# Patient Record
Sex: FEMALE | Race: WHITE | NOT HISPANIC OR LATINO | Employment: FULL TIME | URBAN - METROPOLITAN AREA
[De-identification: names, ages, dates, MRNs, and addresses within clinical notes are randomized per-mention and may not be internally consistent; named-entity substitution may affect disease eponyms.]

---

## 2017-01-13 ENCOUNTER — OFFICE VISIT (OUTPATIENT)
Dept: URGENT CARE | Facility: MEDICAL CENTER | Age: 32
End: 2017-01-13
Payer: COMMERCIAL

## 2017-01-13 PROCEDURE — 99213 OFFICE O/P EST LOW 20 MIN: CPT

## 2017-01-24 ENCOUNTER — ALLSCRIPTS OFFICE VISIT (OUTPATIENT)
Dept: OTHER | Facility: OTHER | Age: 32
End: 2017-01-24

## 2017-01-24 DIAGNOSIS — N91.2 AMENORRHEA: ICD-10-CM

## 2017-01-27 ENCOUNTER — HOSPITAL ENCOUNTER (OUTPATIENT)
Dept: RADIOLOGY | Facility: MEDICAL CENTER | Age: 32
Discharge: HOME | End: 2017-01-27
Payer: COMMERCIAL

## 2017-01-27 DIAGNOSIS — N91.2 AMENORRHEA: ICD-10-CM

## 2017-01-27 LAB
HPV 18 (HISTORICAL): NOT DETECTED
HPV HIGH RISK 16/18 (HISTORICAL): NOT DETECTED
HPV16 (HISTORICAL): DETECTED
PAP (HISTORICAL): ABNORMAL

## 2017-01-27 PROCEDURE — 76830 TRANSVAGINAL US NON-OB: CPT

## 2017-01-27 PROCEDURE — 76856 US EXAM PELVIC COMPLETE: CPT

## 2017-03-08 ENCOUNTER — OFFICE VISIT (OUTPATIENT)
Dept: URGENT CARE | Facility: MEDICAL CENTER | Age: 32
End: 2017-03-08
Payer: COMMERCIAL

## 2017-03-08 PROCEDURE — 99213 OFFICE O/P EST LOW 20 MIN: CPT

## 2017-05-18 ENCOUNTER — OFFICE VISIT (OUTPATIENT)
Dept: URGENT CARE | Facility: MEDICAL CENTER | Age: 32
End: 2017-05-18
Payer: COMMERCIAL

## 2017-05-18 PROCEDURE — 99213 OFFICE O/P EST LOW 20 MIN: CPT

## 2017-10-08 ENCOUNTER — OFFICE VISIT (OUTPATIENT)
Dept: URGENT CARE | Facility: MEDICAL CENTER | Age: 32
End: 2017-10-08
Payer: COMMERCIAL

## 2017-10-08 PROCEDURE — 99213 OFFICE O/P EST LOW 20 MIN: CPT

## 2017-10-14 ENCOUNTER — OFFICE VISIT (OUTPATIENT)
Dept: URGENT CARE | Facility: MEDICAL CENTER | Age: 32
End: 2017-10-14
Payer: COMMERCIAL

## 2017-10-14 PROCEDURE — 99213 OFFICE O/P EST LOW 20 MIN: CPT

## 2017-10-14 NOTE — PROGRESS NOTES
Assessment  1  Metz's neuroma of left foot (355 6) (X67 25)    Plan  Metz's neuroma of left foot    · PredniSONE 20 MG Oral Tablet; Take 1 tablet twice daily    Discussion/Summary  Discussion Summary:   1  Take Prednisone 20mg  1 tablet twice daily x 5 daysICE to area 10-15 min 3-4x dailyOrtho consult if symptoms persist    Understands and agrees with treatment plan: The treatment plan was reviewed with the patient/guardian  The patient/guardian understands and agrees with the treatment plan      Chief Complaint  1  Foot Problem  Chief Complaint Free Text Note Form: While bending down to put on stockings of patient developed pain of L foot - pain 8/10 while walking      History of Present Illness  HPI: The patient is a 16-year-old female presents with pain on her left foot that started one day prior  She states she was bending and had the weight on the balls of her feet 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 when she felt some discomfort between first and second toes of her left foot  She denies any burning or tingling but states the pain is sharp in nature and rates it as an 8 on a scale 1-10  Patient denies pain at rest, her pain increases with weightbearing activity  She denies any prior foot or ankle injuries  Hospital Based Practices Required Assessment:   Pain Assessment   the patient states they have pain  The pain is located in the L foot  (on a scale of 0 to 10, the patient rates the pain at 8 )   Abuse And Domestic Violence Screen    Yes, the patient is safe at home  -- The patient states no one is hurting them  Depression And Suicide Screen  No, the patient has not had thoughts of hurting themself  No, the patient has not felt depressed in the past 7 days  Prefered Language is  english  Primary Language is  english  Readiness To Learn: Receptive  Barriers To Learning: none     Preferred Learning: verbal      Review of Systems  Focused-Female:   Constitutional: No fever, no chills, feels well, no tiredness, no recent weight gain or loss  Musculoskeletal: no arthralgias,-- no joint swelling,-- no myalgias-- and-- no joint stiffness  Neurological: no numbness-- and-- no tingling  Active Problems  1  Acute low back pain (724 2) (M54 5)   2  Amenorrhea (626 0) (N91 2)   3  Asthma (493 90) (J45 909)   4  Encounter for gynecological examination without abnormal finding (V72 31) (Z01 419)   5  History of migraine headaches (V12 49) (Z86 69)   6  Screening for HPV (human papillomavirus) (V73 81) (Z11 51)    Past Medical History  1  History of Acute viral pharyngitis (462) (J02 8,B97 89)   2  History of acute sinusitis (V12 69) (Z87 09)  Active Problems And Past Medical History Reviewed: The active problems and past medical history were reviewed and updated today  Family History  Mother    1  Family history of Anxiety and depression   2  Family history of arthritis (V17 7) (Z82 61)   3  Family history of Sleep disorder  Father    4  Family history of Blood Transfusion (___ Ml)   5  Family history of diabetes mellitus (V18 0) (Z83 3)  Sister    6  Family history of Anxiety and depression  Brother    7  Family history of Anxiety and depression  Paternal Grandmother    6  Family history of breast disorder (V19 8) (Z84 2)   9  Family history of diabetes mellitus (V18 0) (Z83 3)   10  Family history of malignant neoplasm of stomach (V16 0) (Z80 0)   11  Family history of thyroid disease (V18 19) (Z83 49)   12  Family history of Ovarian cancer  Family History    13  Family history of breast disorder (V19 8) (Z84 2)   14  Family history of malignant neoplasm of stomach (V16 0) (Z80 0)   15  Family history of thyroid disease (V18 19) (Z83 49)   16  Family history of Ovarian cancer  Family History Reviewed: The family history was reviewed and updated today  Social History   · Never a smoker   · Sexually active   · Social alcohol use (Z78 9)  Social History Reviewed:  The social history was reviewed and updated today  Surgical History  1  History of Oral Surgery Tooth Extraction  Surgical History Reviewed: The surgical history was reviewed and updated today  Current Meds   1  Albuterol 90 MCG/ACT AERS; Therapy: (Recorded:56Kqm7670) to Recorded   2  Ibuprofen TABS; Therapy: ([id number]) to Recorded  Medication List Reviewed: The medication list was reviewed and updated today  Allergies  1  Lidocaine-Epinephrine SOLN   2  amoxicillin    Vitals  Signs   Recorded: 54JAH9359 10:18AM   Temperature: 98 F  Heart Rate: 78  Respiration: 18  Systolic: 066  Diastolic: 78  Weight: 944 lb   BMI Calculated: 29 04  BSA Calculated: 2  O2 Saturation: 99  Pain Scale: 8    Physical Exam    Constitutional   General appearance: No acute distress, well appearing and well nourished  Pulmonary   Respiratory effort: No increased work of breathing or signs of respiratory distress  Auscultation of lungs: Clear to auscultation  Cardiovascular   Auscultation of heart: Normal rate and rhythm, normal S1 and S2, without murmurs  Musculoskeletal   Inspection/palpation of joints, bones, and muscles: Abnormal  -- There is some tenderness to palpation between the webspace of the first and second toe, no tenderness to palpation over the metatarsals of the metatarsal heads, there is no visible swelling or ecchymosis        Future Appointments    Date/Time Provider Specialty Site   01/29/2018 10:00 AM Renea Mcadams [facility] Obstetrics/Gynecology [facility] OB/GYN [facility]     Signatures   Electronically signed by : Guido Guardado [facility]; Oct  8 2017 10:36AM EST                       (Author)    Electronically signed by : ODESSA Stephens ; Oct 13 2017 11:15AM EST                       (Co-author)

## 2017-10-16 NOTE — PROGRESS NOTES
Assessment  1  Acute URI (465 9) (J06 9)    Discussion/Summary  Discussion Summary:   Take zyrtec as directed fluid intake   Medication Side Effects Reviewed: Possible side effects of new medications were reviewed with the patient/guardian today  Understands and agrees with treatment plan: The treatment plan was reviewed with the patient/guardian  The patient/guardian understands and agrees with the treatment plan   Counseling Documentation With Imm: The patient was counseled regarding diagnostic results,-- instructions for management,-- risk factor reductions,-- prognosis,-- patient and family education,-- impressions,-- risks and benefits of treatment options,-- importance of compliance with treatment  Follow Up Instructions: Follow Up with your Primary Care Provider in 1-2 days  If your symptoms worsen, go to the nearest [place] [id number] Emergency Department  Chief Complaint  Chief Complaint Free Text Note Form: cough, post nasal drip, congestion  started 2 weeks ago  PCP gave her anitbiotics for a URI  History of Present Illness  Hospital Based Practices Required Assessment:   Pain Assessment   the patient states they do not have pain  (on a scale of 0 to 10, the patient rates the pain at 0 )   Abuse And Domestic Violence Screen    Yes, the patient is safe at home  -- The patient states no one is hurting them  Depression And Suicide Screen  No, the patient has not had thoughts of hurting themself  No, the patient has not felt depressed in the past 7 days  Prefered Language is  english  Primary Language is  english  Readiness To Learn: Receptive  Barriers To Learning: none  Preferred Learning: verbal   Upper Respiratory Infection (Brief):  The patient is being seen for an upper respiratory infection   Symptoms: nasal congestion,-- runny nose,-- scratchy throat-- and-- dry cough, but-- no sore throat,-- no productive cough,-- no clear sputum,-- no colored sputum,-- no facial pain,-- no facial pressure,-- no ear pain-- and-- no hoarseness  Associated Symptoms: general malaise, but-- no fever,-- no chills,-- no swollen lymph nodes,-- no headache,-- no myalgias,-- no arthralgias,-- no nausea,-- no vomiting-- and-- no diarrhea  Review of Systems  Focused-Female:   Constitutional: No fever, no chills, feels well, no tiredness, no recent weight gain or loss  ENT: no ear ache, no loss of hearing, no nosebleeds or nasal discharge, no sore throat or hoarseness-- and-- as noted in HPI  Cardiovascular: no complaints of slow or fast heart rate, no chest pain, no palpitations, no leg claudication or lower extremity edema  Respiratory: no complaints of shortness of breath, no wheezing, no dyspnea on exertion, no orthopnea or PND  Breasts: no complaints of breast pain, breast lump or nipple discharge  Gastrointestinal: no complaints of abdominal pain, no constipation, no nausea or diarrhea, no vomiting, no bloody stools  Genitourinary: no complaints of dysuria, no incontinence, no pelvic pain, no dysmenorrhea, no vaginal discharge or abnormal vaginal bleeding  Musculoskeletal: no complaints of arthralgia, no myalgia, no joint swelling or stiffness, no limb pain or swelling  Integumentary: no complaints of skin rash or lesion, no itching or dry skin, no skin wounds  Neurological: no complaints of headache, no confusion, no numbness or tingling, no dizziness or fainting  Active Problems  1  Acute low back pain (724 2) (M54 5)   2  Amenorrhea (626 0) (N91 2)   3  Asthma (493 90) (J45 909)   4  Encounter for gynecological examination without abnormal finding (V72 31) (Z01 419)   5  History of migraine headaches (V12 49) (Z86 69)   6  Metz's neuroma of left foot (355 6) (G57 62)   7  Screening for HPV (human papillomavirus) (V73 81) (Z11 51)    Past Medical History  1  History of Acute viral pharyngitis (462) (J02 8,B97 89)   2   History of acute sinusitis (V12 69) (Z87 09)  Active Problems And Past Medical History Reviewed: The active problems and past medical history were reviewed and updated today  Family History  Mother    1  Family history of Anxiety and depression   2  Family history of arthritis (V17 7) (Z82 61)   3  Family history of Sleep disorder  Father    4  Family history of Blood Transfusion (___ Ml)   5  Family history of diabetes mellitus (V18 0) (Z83 3)  Sister    6  Family history of Anxiety and depression  Brother    7  Family history of Anxiety and depression  Paternal Grandmother    6  Family history of breast disorder (V19 8) (Z84 2)   9  Family history of diabetes mellitus (V18 0) (Z83 3)   10  Family history of malignant neoplasm of stomach (V16 0) (Z80 0)   11  Family history of thyroid disease (V18 19) (Z83 49)   12  Family history of Ovarian cancer  Family History    13  Family history of breast disorder (V19 8) (Z84 2)   14  Family history of malignant neoplasm of stomach (V16 0) (Z80 0)   15  Family history of thyroid disease (V18 19) (Z83 49)   16  Family history of Ovarian cancer  Family History Reviewed: The family history was reviewed and updated today  Social History   · Never a smoker   · Sexually active   · Social alcohol use (Z78 9)  Social History Reviewed: The social history was reviewed and updated today  Surgical History  1  History of Oral Surgery Tooth Extraction  Surgical History Reviewed: The surgical history was reviewed and updated today  Current Meds   1  Albuterol 90 MCG/ACT AERS; Therapy: (Recorded:08Oct2017) to Recorded   2  Ibuprofen TABS; Therapy: ([id number]) to Recorded  Medication List Reviewed: The medication list was reviewed and updated today  Allergies  1   Lidocaine-Epinephrine SOLN   2  amoxicillin    Vitals  Signs   Recorded: 13NSB5754 09:56AM   Temperature: 97 5 F  Heart Rate: 70  Respiration: 16  Systolic: 914  Diastolic: 75  Height: 5 ft 8 in  Weight: 191 lb   BMI Calculated: 29 04  BSA Calculated: 2  O2 Saturation: 100  Pain Scale: 0    Physical Exam    Constitutional   General appearance: No acute distress, well appearing and well nourished  Eyes   Conjunctiva and lids: No swelling, erythema or discharge  Pupils and irises: Equal, round and reactive to light  Ears, Nose, Mouth, and Throat   External inspection of ears and nose: Normal     Otoscopic examination: Tympanic membranes translucent with normal light reflex  Canals patent without erythema  Nasal mucosa, septum, and turbinates: Abnormal   There was clear rhinorrhea from both nares  Oropharynx: Abnormal   The posterior pharynx was erythematous  Inspection of the oropharynx showed fully visible tonsils, uvula and soft palate (Mallampati class 1)  Pulmonary   Respiratory effort: No increased work of breathing or signs of respiratory distress  Auscultation of lungs: Clear to auscultation  Cardiovascular   Palpation of heart: Normal PMI, no thrills  Auscultation of heart: Normal rate and rhythm, normal S1 and S2, without murmurs  Examination of extremities for edema and/or varicosities: Normal     Lymphatic   Palpation of lymph nodes in neck: No lymphadenopathy  Skin   Skin and subcutaneous tissue: Normal without rashes or lesions      Psychiatric   Orientation to person, place, and time: Normal     Mood and affect: Normal        Future Appointments    Date/Time Provider Specialty Site   01/29/2018 10:00 AM [id number] [address] Obstetrics/Gynecology [facility] OB/GYN ASSOC [facility] SURGICAL [facility]     Signatures   Electronically signed by : Marco Antonio Alvarez [facility]; Oct 14 2017 10:19AM EST                       (Author)    Electronically signed by : ODESSA Mcqueen ; Oct 15 2017  9:38AM EST                       (Co-author)

## 2017-12-19 ENCOUNTER — APPOINTMENT (OUTPATIENT)
Dept: PHYSICAL THERAPY | Facility: MEDICAL CENTER | Age: 32
End: 2017-12-19
Payer: COMMERCIAL

## 2017-12-19 PROCEDURE — 97161 PT EVAL LOW COMPLEX 20 MIN: CPT

## 2017-12-19 PROCEDURE — G8991 OTHER PT/OT GOAL STATUS: HCPCS

## 2017-12-19 PROCEDURE — G8990 OTHER PT/OT CURRENT STATUS: HCPCS

## 2017-12-28 ENCOUNTER — APPOINTMENT (OUTPATIENT)
Dept: PHYSICAL THERAPY | Facility: MEDICAL CENTER | Age: 32
End: 2017-12-28
Payer: COMMERCIAL

## 2017-12-28 PROCEDURE — 97110 THERAPEUTIC EXERCISES: CPT

## 2018-01-02 ENCOUNTER — APPOINTMENT (OUTPATIENT)
Dept: PHYSICAL THERAPY | Facility: MEDICAL CENTER | Age: 33
End: 2018-01-02
Payer: COMMERCIAL

## 2018-01-02 PROCEDURE — 97110 THERAPEUTIC EXERCISES: CPT

## 2018-01-04 ENCOUNTER — APPOINTMENT (OUTPATIENT)
Dept: PHYSICAL THERAPY | Facility: MEDICAL CENTER | Age: 33
End: 2018-01-04
Payer: COMMERCIAL

## 2018-01-09 ENCOUNTER — APPOINTMENT (OUTPATIENT)
Dept: PHYSICAL THERAPY | Facility: MEDICAL CENTER | Age: 33
End: 2018-01-09
Payer: COMMERCIAL

## 2018-01-09 PROCEDURE — 97010 HOT OR COLD PACKS THERAPY: CPT

## 2018-01-09 PROCEDURE — 97110 THERAPEUTIC EXERCISES: CPT

## 2018-01-11 ENCOUNTER — APPOINTMENT (OUTPATIENT)
Dept: PHYSICAL THERAPY | Facility: MEDICAL CENTER | Age: 33
End: 2018-01-11
Payer: COMMERCIAL

## 2018-01-11 PROCEDURE — 97010 HOT OR COLD PACKS THERAPY: CPT

## 2018-01-11 PROCEDURE — 97110 THERAPEUTIC EXERCISES: CPT

## 2018-01-15 VITALS
BODY MASS INDEX: 29.55 KG/M2 | SYSTOLIC BLOOD PRESSURE: 106 MMHG | HEIGHT: 68 IN | DIASTOLIC BLOOD PRESSURE: 62 MMHG | WEIGHT: 195 LBS

## 2018-01-16 ENCOUNTER — APPOINTMENT (OUTPATIENT)
Dept: PHYSICAL THERAPY | Facility: MEDICAL CENTER | Age: 33
End: 2018-01-16
Payer: COMMERCIAL

## 2018-01-17 ENCOUNTER — APPOINTMENT (OUTPATIENT)
Dept: PHYSICAL THERAPY | Facility: MEDICAL CENTER | Age: 33
End: 2018-01-17
Payer: COMMERCIAL

## 2018-01-17 PROCEDURE — 97110 THERAPEUTIC EXERCISES: CPT

## 2018-01-18 ENCOUNTER — APPOINTMENT (OUTPATIENT)
Dept: PHYSICAL THERAPY | Facility: MEDICAL CENTER | Age: 33
End: 2018-01-18
Payer: COMMERCIAL

## 2018-01-18 PROCEDURE — 97110 THERAPEUTIC EXERCISES: CPT

## 2018-01-18 NOTE — PROGRESS NOTES
Assessment    1  Acute viral pharyngitis (462) (J02 8,B60 89)    Discussion/Summary  Discussion Summary:   Take motrin as directed for pain  Increase fluid intake   Warm water salt gargles, throat lozenges, and honey tea for sore throat  Medication Side Effects Reviewed: Possible side effects of new medications were reviewed with the patient/guardian today  Understands and agrees with treatment plan: The treatment plan was reviewed with the patient/guardian  The patient/guardian understands and agrees with the treatment plan   Counseling Documentation With Imm: The patient was counseled regarding diagnostic results, instructions for management, risk factor reductions, prognosis, patient and family education, impressions, risks and benefits of treatment options, importance of compliance with treatment  Follow Up Instructions: Follow Up with your Primary Care Provider in 1-2 days  If your symptoms worsen, go to the nearest [facility] Emergency Department  Chief Complaint  Chief Complaint Free Text Note Form: started 3 days ago with runny nose PND, congestion, slight cough, feels like something is "stuck in her throat on the side of the tongue started l;ast night      History of Present Illness  Hospital Based Practices Required Assessment:   Pain Assessment   the patient states they have pain  The pain is located in the throat  (on a scale of 0 to 10, the patient rates the pain at 3 )   Abuse And Domestic Violence Screen    Yes, the patient is safe at home  The patient states no one is hurting them  Depression And Suicide Screen  No, the patient has not had thoughts of hurting themself  No, the patient has not felt depressed in the past 7 days  Prefered Language is  english  Primary Language is  english  Readiness To Learn: Receptive  Barriers To Learning: none  Preferred Learning: verbal   Pharyngitis (Brief):  The patient is being seen for an initial evaluation of and symptoms x 2 days pharyngitis  Symptoms:  sore throat, nasal congestion, postnasal drainage and swollen glands, but no fever, no chills and no rash  No associated symptoms are reported  Review of Systems  Focused-Female:   Constitutional: No fever, no chills, feels well, no tiredness, no recent weight gain or loss  ENT: no ear ache, no loss of hearing, no nosebleeds or nasal discharge, no sore throat or hoarseness and as noted in HPI  Cardiovascular: no complaints of slow or fast heart rate, no chest pain, no palpitations, no leg claudication or lower extremity edema  Respiratory: no complaints of shortness of breath, no wheezing, no dyspnea on exertion, no orthopnea or PND  Breasts: no complaints of breast pain, breast lump or nipple discharge  Gastrointestinal: no complaints of abdominal pain, no constipation, no nausea or diarrhea, no vomiting, no bloody stools  Genitourinary: no complaints of dysuria, no incontinence, no pelvic pain, no dysmenorrhea, no vaginal discharge or abnormal vaginal bleeding  Musculoskeletal: no complaints of arthralgia, no myalgia, no joint swelling or stiffness, no limb pain or swelling  Integumentary: no complaints of skin rash or lesion, no itching or dry skin, no skin wounds  Neurological: no complaints of headache, no confusion, no numbness or tingling, no dizziness or fainting  ROS Reviewed:   ROS reviewed  Active Problems    1  Acute sinusitis (461 9) (J01 90)   2  Amenorrhea (626 0) (N91 2)   3  Asthma (493 90) (J45 909)   4  Encounter for gynecological examination without abnormal finding (V72 31) (Z01 419)   5  Screening for HPV (human papillomavirus) (V73 81) (Z11 51)    Past Medical History    1  History of migraine headaches (V12 49) (Z86 69)  Active Problems And Past Medical History Reviewed: The active problems and past medical history were reviewed and updated today  Family History  Mother    1  Family history of Anxiety and depression   2   Family history of arthritis (V17 7) (Z82 61)   3  Family history of Sleep disorder  Father    4  Family history of Blood Transfusion (___ Ml)   5  Family history of diabetes mellitus (V18 0) (Z83 3)  Sister    6  Family history of Anxiety and depression  Brother    7  Family history of Anxiety and depression  Paternal Grandmother    6  Family history of breast disorder (V19 8) (Z84 2)   9  Family history of diabetes mellitus (V18 0) (Z83 3)   10  Family history of malignant neoplasm of stomach (V16 0) (Z80 0)   11  Family history of thyroid disease (V18 19) (Z83 49)   12  Family history of Ovarian cancer  Family History    13  Family history of breast disorder (V19 8) (Z84 2)   14  Family history of malignant neoplasm of stomach (V16 0) (Z80 0)   15  Family history of thyroid disease (V18 19) (Z83 49)   16  Family history of Ovarian cancer  Family History Reviewed: The family history was reviewed and updated today  Social History    · Never a smoker   · Sexually active   · Social alcohol use (Z78 9)  Social History Reviewed: The social history was reviewed and updated today  Surgical History    1  History of Oral Surgery Tooth Extraction  Surgical History Reviewed: The surgical history was reviewed and updated today  Current Meds   1  Ibuprofen TABS; Therapy: ([id number]) to Recorded  Medication List Reviewed: The medication list was reviewed and updated today  Allergies    1  Lidocaine-Epinephrine SOLN   2  amoxicillin    Vitals  Signs   Recorded: 53VNB9826 08:02PM   Temperature: 98 6 F  Heart Rate: 78  Respiration: 20  Systolic: 598  Diastolic: 80  Weight: 604 lb   BMI Calculated: 29 5  BSA Calculated: 2 02  Pain Scale: 3    Physical Exam    Constitutional   General appearance: No acute distress, well appearing and well nourished  Eyes   Conjunctiva and lids: No swelling, erythema or discharge  Pupils and irises: Equal, round and reactive to light      Ears, Nose, Mouth, and Throat   External inspection of ears and nose: Normal     Otoscopic examination: Tympanic membranes translucent with normal light reflex  Canals patent without erythema  Nasal mucosa, septum, and turbinates: Abnormal   normal nasal mucosa  There was a purulent discharge from both nares  Oropharynx: Abnormal   The posterior pharynx was erythematous, had a(n) right ulcer and + PND  Inspection of the oropharynx showed visible hard and soft palate, upper portion of tonsils and uvula (Mallampati class 2)  Pulmonary   Respiratory effort: No increased work of breathing or signs of respiratory distress  Auscultation of lungs: Clear to auscultation  Cardiovascular   Palpation of heart: Normal PMI, no thrills  Auscultation of heart: Normal rate and rhythm, normal S1 and S2, without murmurs  Examination of extremities for edema and/or varicosities: Normal     Abdomen   Abdomen: Non-tender, no masses  Liver and spleen: No hepatomegaly or splenomegaly  Lymphatic   Palpation of lymph nodes in neck: No lymphadenopathy  Musculoskeletal   Gait and station: Normal     Digits and nails: Normal without clubbing or cyanosis  Inspection/palpation of joints, bones, and muscles: Normal     Skin   Skin and subcutaneous tissue: Normal without rashes or lesions  Neurologic   Cranial nerves: Cranial nerves 2-12 intact  Reflexes: 2+ and symmetric  Sensation: No sensory loss      Psychiatric   Orientation to person, place, and time: Normal     Mood and affect: Normal        Future Appointments    Date/Time Provider Specialty Site   01/29/2018 10:00 AM Phillip Cartwright [facility] Obstetrics/Gynecology [facility] OB/GYN ASSOC [facility] SURGICAL [facility]     Signatures   Electronically signed by : Frida Cooper [facility]; Mar  8 2017  8:24PM EST                       (Author)    Electronically signed by : ODESSA Chavez ; Mar  9 2017  4:15PM EST                       (Co-author)

## 2018-01-23 ENCOUNTER — APPOINTMENT (OUTPATIENT)
Dept: PHYSICAL THERAPY | Facility: MEDICAL CENTER | Age: 33
End: 2018-01-23
Payer: COMMERCIAL

## 2018-01-25 ENCOUNTER — APPOINTMENT (OUTPATIENT)
Dept: PHYSICAL THERAPY | Facility: MEDICAL CENTER | Age: 33
End: 2018-01-25
Payer: COMMERCIAL

## 2018-01-30 ENCOUNTER — APPOINTMENT (OUTPATIENT)
Dept: PHYSICAL THERAPY | Facility: MEDICAL CENTER | Age: 33
End: 2018-01-30
Payer: COMMERCIAL

## 2018-02-13 ENCOUNTER — OFFICE VISIT (OUTPATIENT)
Dept: OBGYN CLINIC | Facility: MEDICAL CENTER | Age: 33
End: 2018-02-13
Payer: COMMERCIAL

## 2018-02-13 VITALS
HEIGHT: 67 IN | WEIGHT: 192 LBS | SYSTOLIC BLOOD PRESSURE: 116 MMHG | BODY MASS INDEX: 30.13 KG/M2 | DIASTOLIC BLOOD PRESSURE: 72 MMHG

## 2018-02-13 DIAGNOSIS — R87.610 ASCUS WITH POSITIVE HIGH RISK HPV CERVICAL: ICD-10-CM

## 2018-02-13 DIAGNOSIS — B97.7 HPV (HUMAN PAPILLOMA VIRUS) INFECTION: Primary | ICD-10-CM

## 2018-02-13 DIAGNOSIS — N91.2 AMENORRHEA: ICD-10-CM

## 2018-02-13 DIAGNOSIS — Z01.419 ENCOUNTER FOR GYNECOLOGICAL EXAMINATION (GENERAL) (ROUTINE) WITHOUT ABNORMAL FINDINGS: ICD-10-CM

## 2018-02-13 DIAGNOSIS — R87.810 ASCUS WITH POSITIVE HIGH RISK HPV CERVICAL: ICD-10-CM

## 2018-02-13 PROBLEM — G57.62 MORTON'S NEUROMA OF LEFT FOOT: Status: ACTIVE | Noted: 2017-10-08

## 2018-02-13 PROCEDURE — 88344 IMHCHEM/IMCYTCHM EA MLT ANTB: CPT | Performed by: PATHOLOGY

## 2018-02-13 PROCEDURE — 99395 PREV VISIT EST AGE 18-39: CPT | Performed by: PHYSICIAN ASSISTANT

## 2018-02-13 PROCEDURE — 57454 BX/CURETT OF CERVIX W/SCOPE: CPT | Performed by: PHYSICIAN ASSISTANT

## 2018-02-13 PROCEDURE — 88305 TISSUE EXAM BY PATHOLOGIST: CPT | Performed by: PHYSICIAN ASSISTANT

## 2018-02-13 PROCEDURE — 88344 IMHCHEM/IMCYTCHM EA MLT ANTB: CPT | Performed by: PHYSICIAN ASSISTANT

## 2018-02-13 PROCEDURE — 88305 TISSUE EXAM BY PATHOLOGIST: CPT | Performed by: PATHOLOGY

## 2018-02-13 RX ORDER — MEDROXYPROGESTERONE ACETATE 10 MG/1
10 TABLET ORAL DAILY
Qty: 7 TABLET | Refills: 0 | Status: SHIPPED | OUTPATIENT
Start: 2018-02-13 | End: 2018-03-23 | Stop reason: ALTCHOICE

## 2018-02-13 RX ORDER — ALBUTEROL SULFATE 90 UG/1
AEROSOL, METERED RESPIRATORY (INHALATION)
COMMUNITY
End: 2019-03-25

## 2018-02-13 RX ORDER — TRAMADOL HYDROCHLORIDE 50 MG/1
TABLET ORAL
COMMUNITY
Start: 2018-01-22 | End: 2020-06-29 | Stop reason: ALTCHOICE

## 2018-02-13 RX ORDER — MEDROXYPROGESTERONE ACETATE 10 MG/1
10 TABLET ORAL DAILY
Qty: 7 TABLET | Refills: 0 | Status: SHIPPED | OUTPATIENT
Start: 2018-02-13 | End: 2018-02-13 | Stop reason: SDUPTHER

## 2018-02-13 NOTE — PROGRESS NOTES
Colposcopy  Date/Time: 2/13/2018 3:45 PM  Performed by: Theodora Andrade  Authorized by: Theodora Andrade     Consent:     Consent obtained:  Written    Consent given by:  Patient    Procedural risks discussed:  Bleeding, damage to other organs and infection    Patient questions answered: yes      Patient agrees, verbalizes understanding, and wants to proceed: yes    Pre-procedure:     Prepped with: acetic acid    Indication:     Indication:  ASC-US  Procedure:     Procedure: Colposcopy w/ cervical biopsy and ECC      [place] speculum was placed in the vagina: yes      Under colposcopic examination the transition zone was seen in entirety: yes      Endocervix was curetted using a Kevorkian curette: yes      Cervical biopsy performed with a cervical biopsy punch: yes      Monsel's solution was applied: yes      Biopsy(s): yes      Location:  12, 6, and 9 o'clock    Specimen to pathology: yes    Post-procedure:     Findings: White epithelium      Impression: Low grade cervical dysplasia      Patient tolerance of procedure: Tolerated well, no immediate complications      Problem List Items Addressed This Visit     Encounter for gynecological examination (general) (routine) without abnormal findings     Annual exam performed, pap deferred, performed colpo instead since patient never had done last year  Provera rx sent via EMR, patient wants to observe menses after provera to see if returns to normal   Discussed further work up, pt wants to wait  RTO 1 year  ASCUS with positive high risk HPV cervical     Colposcopy performed today without complication, 3 biopsies and ECC performed and sent to pathology, will call patient with results  Can expect cramping, irregular bleeding and clumpy discharge    Call if any heavy bleeding, severe abdominal pain, or fever > 101           Relevant Orders    Colposcopy      Other Visit Diagnoses     HPV (human papilloma virus) infection    -  Primary    Amenorrhea Relevant Medications    medroxyPROGESTERone (PROVERA) 10 mg tablet

## 2018-02-13 NOTE — PROGRESS NOTES
Assessment/Plan  Problem List Items Addressed This Visit     Encounter for gynecological examination (general) (routine) without abnormal findings     Annual exam performed, pap deferred, performed colpo instead since patient never had done last year  Provera rx sent via EMR, patient wants to observe menses after provera to see if returns to normal   Discussed further work up, pt wants to wait  RTO 1 year  ASCUS with positive high risk HPV cervical     Colposcopy performed today without complication, 3 biopsies and ECC performed and sent to pathology, will call patient with results  Can expect cramping, irregular bleeding and clumpy discharge  Call if any heavy bleeding, severe abdominal pain, or fever > 101           Relevant Orders    Colposcopy      Other Visit Diagnoses     HPV (human papilloma virus) infection    -  Primary    Amenorrhea        Relevant Medications    medroxyPROGESTERone (PROVERA) 10 mg tablet          Halina Birmingham is a 33 y o  female who presents for annual GYN exam  She reports any changes in Medical history- dx with degenerative disc disease  She denies any changes in surgical and family histories  Periods are irregular, menses every 2-6 months, lmp 9/2017  Dysmenorrhea:mild, occurring first 1-2 days of flow  She denies intermenstrual bleeding, spotting, or discharge  She reports that she is sexually active with 1 partner and using none for contraception  Would welcome pregnancy  Last Pap smear: 1/2017, ASCUS HPV 16 (+) never returned for colpo due to work schedule  Regular self breast exam: yes  Family history of uterine or ovarian cancer: no  Family history of breast cancer: yes - maternal aunt  Family history of colon cancer: yes - MGM    Menstrual History:  OB History     No data available         Patient's last menstrual period was 09/25/2017    Period Pattern: Regular  Menstrual Flow: Moderate  Dysmenorrhea: (!) Mild  Dysmenorrhea Symptoms: Cramping    The following portions of the patient's history were reviewed and updated as appropriate: allergies, current medications, past family history, past medical history, past social history, past surgical history and problem list     Review of Systems  Review of Systems   Constitutional: Negative for chills and fever  Respiratory: Negative for shortness of breath  Cardiovascular: Negative for chest pain  Gastrointestinal: Negative for abdominal pain  Genitourinary: Negative for dysuria, pelvic pain, vaginal discharge and vaginal pain  Negative for breast pain or lumps  Negative for stress urinary incontinence  Neurological: Negative for headaches  Objective   /72 (BP Location: Right arm, Patient Position: Sitting, Cuff Size: Standard)   Ht 5' 7" (1 702 m)   Wt 87 1 kg (192 lb)   LMP 09/25/2017   BMI 30 07 kg/m²     Physical Exam   Constitutional: She is oriented to person, place, and time  She appears well-developed and well-nourished  Neck: No thyromegaly present  Cardiovascular: Normal rate and regular rhythm  Pulmonary/Chest: Effort normal and breath sounds normal  Right breast exhibits no mass, no nipple discharge, no skin change and no tenderness  Left breast exhibits no mass, no nipple discharge, no skin change and no tenderness  Abdominal: Soft  There is no tenderness  There is no rebound and no guarding  Genitourinary: There is no rash or lesion on the right labia  There is no rash or lesion on the left labia  Uterus is not enlarged and not tender  Cervix exhibits no motion tenderness and no discharge  Right adnexum displays no mass and no tenderness  Left adnexum displays no mass and no tenderness  No bleeding in the vagina  No vaginal discharge found  Neurological: She is alert and oriented to person, place, and time  Psychiatric: She has a normal mood and affect  Nursing note and vitals reviewed

## 2018-02-13 NOTE — PATIENT INSTRUCTIONS
Colposcopy   WHAT YOU NEED TO KNOW:   A colposcopy is a procedure to look for abnormal cells in your cervix and vagina  Your healthcare provider will use a colposcope, which is a small scope with a light on it  DISCHARGE INSTRUCTIONS:   Medicines:   · Pain medicine: You may be given medicine to take away or decrease pain  Do not wait until the pain is severe before you take your medicine  · Take your medicine as directed  Call your healthcare provider if you think your medicine is not helping or if you have side effects  Tell him if you are allergic to any medicine  Keep a list of the medicines, vitamins, and herbs you take  Include the amounts, and when and why you take them  Bring the list or the pill bottles to follow-up visits  Carry your medicine list with you in case of an emergency  Follow up with your healthcare provider or gynecologist as directed: You may need to return for more tests or to have abnormal cells removed  Write down your questions so you remember to ask them during your visits  Self-care:  Use a sanitary pad for any light bleeding  Ask when it is okay to use tampons, douche, or have sex  If you are pregnant, do not put anything in your vagina until your healthcare provider says it is okay  Avoid heavy lifting for 24 hours after your procedure, this will decrease your risk of bleeding  Contact your healthcare provider or gynecologist if:   · You have pain that does not go away, even after you take pain medicine  · You have a fever  · You have questions or concerns about your condition or care  Seek care immediately or call 911 if:   · You have bleeding from your vagina that is heavier than your monthly period  © 2016 [employer] is for End User's use only and may not be sold, redistributed or otherwise used for commercial purposes   All illustrations and images included in CareNotes® are the copyrighted property of A D A M [employer] Inc  or [employer]  The above information is an  only  It is not intended as medical advice for individual conditions or treatments  Talk to your doctor, nurse or pharmacist before following any medical regimen to see if it is safe and effective for you

## 2018-02-13 NOTE — ASSESSMENT & PLAN NOTE
Colposcopy performed today without complication, 3 biopsies and ECC performed and sent to pathology, will call patient with results  Can expect cramping, irregular bleeding and clumpy discharge    Call if any heavy bleeding, severe abdominal pain, or fever > 101

## 2018-02-13 NOTE — ASSESSMENT & PLAN NOTE
Annual exam performed, pap deferred, performed colpo instead since patient never had done last year  Provera rx sent via EMR, patient wants to observe menses after provera to see if returns to normal   Discussed further work up, pt wants to wait  RTO 1 year

## 2018-03-23 ENCOUNTER — OFFICE VISIT (OUTPATIENT)
Dept: OBGYN CLINIC | Facility: MEDICAL CENTER | Age: 33
End: 2018-03-23
Payer: COMMERCIAL

## 2018-03-23 VITALS — DIASTOLIC BLOOD PRESSURE: 70 MMHG | SYSTOLIC BLOOD PRESSURE: 120 MMHG | WEIGHT: 192 LBS | BODY MASS INDEX: 30.07 KG/M2

## 2018-03-23 DIAGNOSIS — D06.9 SEVERE DYSPLASIA OF CERVIX (CIN III): Primary | ICD-10-CM

## 2018-03-23 PROBLEM — M51.9 LUMBAR DISC DISORDER: Status: ACTIVE | Noted: 2018-03-23

## 2018-03-23 PROBLEM — G43.009 MIGRAINE WITHOUT AURA AND WITHOUT STATUS MIGRAINOSUS, NOT INTRACTABLE: Status: ACTIVE | Noted: 2018-03-23

## 2018-03-23 PROBLEM — J45.20 MILD INTERMITTENT ASTHMA WITHOUT COMPLICATION: Status: ACTIVE | Noted: 2018-03-23

## 2018-03-23 PROCEDURE — 99213 OFFICE O/P EST LOW 20 MIN: CPT | Performed by: OBSTETRICS & GYNECOLOGY

## 2018-03-23 NOTE — PROGRESS NOTES
Assessment/Plan:   Problem List Items Addressed This Visit        Genitourinary    Severe dysplasia of cervix (MARIPOSA III) - Primary     MARIPOSA II-III found on cervical biopsy at 6 o'clock  Recommended treatment for this is to proceed with a LEEP procedure  We discussed what a LEEP procedure entails, how it is performed  We discussed that rationale for performing the procedure to remove the pre-cancerous cells  We also discussed risks of surgery including bleeding, infection, damage to surrounding tissues, and hypothetical risk of a short cervix in pregnancy with PTL/PTB  Consent was obtained today  We will plan on proceeding in the office  I spent 20 minutes with the patient, >50% of the time counseling  Subjective:     Patient ID: Tricia Chinchilla is a 33 y o  female  Patient is a 26yo G0 here to discuss her colposcopy results and the recommended LEEP procedure  She had an abnormal pap - and underwent colposcopy which revealed MARIPOSA II-III at 6 o'clock  She reports she has previously had abnormal paps and had a colposcopy - but did not need any further treatment  She works as a CNA, 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 is considering a job change in the near future  She does plan on having children, is uncertain as to the timing  Reviewed her problem list and PMH/PSH with her today  Review of Systems   Constitutional: Negative for chills and fever  Respiratory: Negative for shortness of breath  Cardiovascular: Negative for chest pain  Gastrointestinal: Negative for abdominal pain  Genitourinary: Negative for vaginal bleeding and vaginal discharge  Musculoskeletal: Positive for back pain  Psychiatric/Behavioral: Negative  No past medical history on file    Past Surgical History:   Procedure Laterality Date    WISDOM TOOTH EXTRACTION  07/2004     Patient Active Problem List   Diagnosis    Metz's neuroma of left foot    Encounter for gynecological examination (general) (routine) without abnormal findings    Severe dysplasia of cervix (MARIPOSA III)    Lumbar disc disorder    Migraine without aura and without status migrainosus, not intractable    Mild intermittent asthma without complication         Objective:    /70   Wt 87 1 kg (192 lb)   LMP 03/01/2018   BMI 30 07 kg/m²      Physical Exam   Constitutional: She is oriented to person, place, and time  She appears well-developed and well-nourished  No distress  Cardiovascular: Normal rate, regular rhythm and normal heart sounds  No murmur heard  Pulmonary/Chest: Effort normal and breath sounds normal  No respiratory distress  She has no wheezes  She exhibits no tenderness  Neurological: She is alert and oriented to person, place, and time  Skin: Skin is warm and dry  She is not diaphoretic  Psychiatric: She has a normal mood and affect   Her behavior is normal

## 2018-03-23 NOTE — ASSESSMENT & PLAN NOTE
MARIPOSA II-III found on cervical biopsy at 6 o'clock  Recommended treatment for this is to proceed with a LEEP procedure  We discussed what a LEEP procedure entails, how it is performed  We discussed that rationale for performing the procedure to remove the pre-cancerous cells  We also discussed risks of surgery including bleeding, infection, damage to surrounding tissues, and hypothetical risk of a short cervix in pregnancy with PTL/PTB  Consent was obtained today  We will plan on proceeding in the office

## 2018-04-12 ENCOUNTER — DOCUMENTATION (OUTPATIENT)
Dept: OBGYN CLINIC | Facility: CLINIC | Age: 33
End: 2018-04-12

## 2018-04-12 ENCOUNTER — TELEPHONE (OUTPATIENT)
Dept: OBGYN CLINIC | Facility: CLINIC | Age: 33
End: 2018-04-12

## 2018-04-12 NOTE — TELEPHONE ENCOUNTER
Certified letter mailed
I have left Ron Gardner 5 messages, messages were left on 3/28/18, 3/29/18, 4/4/18, 4/5/18, and today 4/12/18  Please send pt a certified letter to call if she would like us to do her LEEP procedure   Thank you
spouse

## 2018-04-25 ENCOUNTER — DOCUMENTATION (OUTPATIENT)
Dept: OBGYN CLINIC | Facility: CLINIC | Age: 33
End: 2018-04-25

## 2019-03-25 ENCOUNTER — ANNUAL EXAM (OUTPATIENT)
Dept: OBGYN CLINIC | Facility: MEDICAL CENTER | Age: 34
End: 2019-03-25
Payer: COMMERCIAL

## 2019-03-25 VITALS
SYSTOLIC BLOOD PRESSURE: 120 MMHG | WEIGHT: 193 LBS | HEIGHT: 67 IN | DIASTOLIC BLOOD PRESSURE: 66 MMHG | BODY MASS INDEX: 30.29 KG/M2

## 2019-03-25 DIAGNOSIS — D06.9 SEVERE DYSPLASIA OF CERVIX (CIN III): ICD-10-CM

## 2019-03-25 DIAGNOSIS — Z11.51 SCREENING FOR HUMAN PAPILLOMAVIRUS (HPV): ICD-10-CM

## 2019-03-25 DIAGNOSIS — Z01.419 ENCNTR FOR GYN EXAM (GENERAL) (ROUTINE) W/O ABN FINDINGS: Primary | ICD-10-CM

## 2019-03-25 PROCEDURE — 87624 HPV HI-RISK TYP POOLED RSLT: CPT | Performed by: PHYSICIAN ASSISTANT

## 2019-03-25 PROCEDURE — 99395 PREV VISIT EST AGE 18-39: CPT | Performed by: PHYSICIAN ASSISTANT

## 2019-03-25 PROCEDURE — G0145 SCR C/V CYTO,THINLAYER,RESCR: HCPCS | Performed by: PHYSICIAN ASSISTANT

## 2019-03-25 NOTE — ASSESSMENT & PLAN NOTE
Annual exam and pap performed, will call if abnormal  Not interested in contraception, would be ok if got pregnant, rec starting PNV if capable of pregnancy    RTO 1 year

## 2019-03-25 NOTE — PROGRESS NOTES
Assessment/Plan  Problem List Items Addressed This Visit        Genitourinary    Severe dysplasia of cervix (MARIPOSA III)    Relevant Orders    Liquid-based pap, screening       Other    Encntr for gyn exam (general) (routine) w/o abn findings - Primary     Annual exam and pap performed, will call if abnormal  Not interested in contraception, would be ok if got pregnant, rec starting PNV if capable of pregnancy  RTO 1 year         Relevant Orders    Liquid-based pap, screening      Other Visit Diagnoses     Screening for human papillomavirus (HPV)        Relevant Orders    Liquid-based pap, screening          Subjective   Kasandra Johnson is a 29 y o  female who presents for annual GYN exam  She denies any changes in Surgical or Family histories  Medical hx changes- dx with anemia  Periods are regular every 28-30 days  Dysmenorrhea:moderate, occurring first 1-2 days of flow  She denies intermenstrual bleeding, spotting, or discharge  She reports that she is sexually active with 1 partner and using none for contraception  Last Pap smear: 2017 ASCUS HPV+, 2018 CIN2-3 never scheduled LEEP due to insurance issues  Regular self breast exam: yes    Family history of uterine or ovarian cancer: MGM ovarian  Family history of breast cancer: no  Family history of colon cancer: no    Menstrual History:  OB History        0    Para   0    Term   0       0    AB   0    Living   0       SAB   0    TAB   0    Ectopic   0    Multiple   0    Live Births   0                Patient's last menstrual period was 2019 (exact date)    Period Pattern: (!) Irregular  Menstrual Flow: Light, Heavy  Dysmenorrhea: (!) Mild  Dysmenorrhea Symptoms: Cramping    The following portions of the patient's history were reviewed and updated as appropriate: allergies, current medications, past family history, past medical history, past social history, past surgical history and problem list     Review of Systems  Review of Systems Constitutional: Negative for chills and fever  Respiratory: Negative for shortness of breath  Cardiovascular: Negative for chest pain  Gastrointestinal: Negative for abdominal pain  Genitourinary: Negative for dysuria, pelvic pain, vaginal bleeding, vaginal discharge and vaginal pain  Negative for breast pain or lumps  Negative for stress urinary incontinence  Neurological: Negative for headaches  Objective   /66 (BP Location: Left arm, Patient Position: Sitting, Cuff Size: Large)   Ht 5' 7" (1 702 m)   Wt 87 5 kg (193 lb)   LMP 03/21/2019 (Exact Date)   BMI 30 23 kg/m²     Physical Exam   Constitutional: She is oriented to person, place, and time  She appears well-developed and well-nourished  Neck: No thyromegaly present  Cardiovascular: Normal rate and regular rhythm  Pulmonary/Chest: Effort normal and breath sounds normal  Right breast exhibits no mass, no nipple discharge, no skin change and no tenderness  Left breast exhibits no mass, no nipple discharge, no skin change and no tenderness  Abdominal: Soft  There is no tenderness  There is no rebound and no guarding  Genitourinary: There is no rash or lesion on the right labia  There is no rash or lesion on the left labia  Uterus is not enlarged and not tender  Cervix exhibits no motion tenderness and no discharge  Right adnexum displays no mass and no tenderness  Left adnexum displays no mass and no tenderness  No bleeding in the vagina  No vaginal discharge found  Neurological: She is alert and oriented to person, place, and time  Psychiatric: She has a normal mood and affect  Nursing note and vitals reviewed

## 2019-03-27 LAB
HPV HR 12 DNA CVX QL NAA+PROBE: NEGATIVE
HPV16 DNA CVX QL NAA+PROBE: NEGATIVE
HPV18 DNA CVX QL NAA+PROBE: NEGATIVE

## 2019-03-29 LAB
LAB AP GYN PRIMARY INTERPRETATION: NORMAL
LAB AP LMP: NORMAL
Lab: NORMAL

## 2019-05-03 DIAGNOSIS — IMO0001 CONTRACEPTION: Primary | ICD-10-CM

## 2019-05-03 RX ORDER — DROSPIRENONE AND ETHINYL ESTRADIOL 0.02-3(28)
1 KIT ORAL DAILY
Qty: 28 TABLET | Refills: 2 | Status: SHIPPED | OUTPATIENT
Start: 2019-05-03 | End: 2019-06-18 | Stop reason: SDUPTHER

## 2019-06-18 DIAGNOSIS — IMO0001 CONTRACEPTION: ICD-10-CM

## 2019-06-18 RX ORDER — DROSPIRENONE AND ETHINYL ESTRADIOL 0.02-3(28)
1 KIT ORAL DAILY
Qty: 28 TABLET | Refills: 10 | Status: SHIPPED | OUTPATIENT
Start: 2019-06-18 | End: 2019-11-01 | Stop reason: SINTOL

## 2019-06-25 ENCOUNTER — TELEPHONE (OUTPATIENT)
Dept: OBGYN CLINIC | Facility: MEDICAL CENTER | Age: 34
End: 2019-06-25

## 2019-10-24 ENCOUNTER — APPOINTMENT (OUTPATIENT)
Dept: RADIOLOGY | Facility: MEDICAL CENTER | Age: 34
End: 2019-10-24
Payer: COMMERCIAL

## 2019-10-24 ENCOUNTER — OFFICE VISIT (OUTPATIENT)
Dept: URGENT CARE | Facility: MEDICAL CENTER | Age: 34
End: 2019-10-24
Payer: COMMERCIAL

## 2019-10-24 VITALS
SYSTOLIC BLOOD PRESSURE: 108 MMHG | HEIGHT: 68 IN | OXYGEN SATURATION: 100 % | TEMPERATURE: 98.2 F | BODY MASS INDEX: 28.64 KG/M2 | WEIGHT: 189 LBS | HEART RATE: 67 BPM | DIASTOLIC BLOOD PRESSURE: 70 MMHG | RESPIRATION RATE: 18 BRPM

## 2019-10-24 DIAGNOSIS — S69.91XA INJURY OF RIGHT WRIST, INITIAL ENCOUNTER: Primary | ICD-10-CM

## 2019-10-24 PROCEDURE — G0382 LEV 3 HOSP TYPE B ED VISIT: HCPCS | Performed by: PHYSICIAN ASSISTANT

## 2019-10-24 PROCEDURE — 73110 X-RAY EXAM OF WRIST: CPT

## 2019-10-24 RX ORDER — IBUPROFEN 800 MG/1
TABLET ORAL
COMMUNITY

## 2019-10-24 RX ORDER — LEVOCETIRIZINE DIHYDROCHLORIDE 5 MG/1
5 TABLET, FILM COATED ORAL EVERY EVENING
Refills: 0 | COMMUNITY
Start: 2019-09-20

## 2019-10-24 NOTE — PROGRESS NOTES
[facility]        NAME: Power Beth is a 29 y o  female  : 1985    MRN: [id number]  DATE: 2019  TIME: 5:40 PM    Assessment and Plan   Injury of right wrist, initial encounter [S69 [id number]]  1  Injury of right wrist, initial encounter  XR wrist 3+ vw right     Preliminary x-ray shows no acute fracture  Will call if positive result  Recommended utilizing Ace wrap while at work  Ice and Motrin for pain  Recommended follow-up with orthopedics if symptoms do not improve after a week  Patient Instructions     May utilize Ace wrap on wrist  Utilize Motrin for pain  Ice the wrist   followup with Orthopedics if symptoms do not improve after a week    Chief Complaint     Chief Complaint   Patient presents with    Wrist Injury     Pt states she was walking up steps and hit her right wrist on a step 10-19-19  History of Present Illness         Patient is a 20-year-old female who presents today with complaints of right wrist injury  She reports approximately 5 days ago she fell onto a step  She fell with her fist clenched and her wrist hit the pavement  No other injuries  She reports bruising on the wrist   She reports most of her pain is on the lateral aspect of the right wrist       Review of Systems   Review of Systems   Constitutional: Negative for fever  Respiratory: Negative for shortness of breath  Cardiovascular: Negative for chest pain  Musculoskeletal: Positive for arthralgias  Negative for joint swelling  Skin: Positive for color change           Current Medications       Current Outpatient Medications:     drospirenone-ethinyl estradiol (PRATEEK) 3-0 02 MG per tablet, Take 1 tablet by mouth daily for 46 days, Disp: 28 tablet, Rfl: 10    ibuprofen (MOTRIN) 800 mg tablet, Take by mouth, Disp: , Rfl:     levocetirizine (XYZAL) 5 MG tablet, Take 5 mg by mouth every evening, Disp: , Rfl: 0    traMADol (ULTRAM) 50 mg tablet, , Disp: , Rfl:     Current Allergies Allergies as of 10/24/2019 - Reviewed 10/24/2019   Allergen Reaction Noted    Amoxicillin  09/13/2016    Lidocaine Rash 09/13/2016            The following portions of the patient's history were reviewed and updated as appropriate: allergies, current medications, past family history, past medical history, past social history, past surgical history and problem list      Past Medical History:   Diagnosis Date    Abnormal Pap smear of cervix     Anemia     Mild normocytic anemia    HPV (human papilloma virus) infection        Past Surgical History:   Procedure Laterality Date    COLPOSCOPY  02/13/2018    MARIPOSA 2-3    WISDOM TOOTH EXTRACTION  07/2004       Family History   Problem Relation Age of Onset    Anxiety disorder Mother     Depression Mother     Arthritis Mother     Sleep disorder Mother     Bipolar disorder Mother     Diabetes Father     Other Father         bladder cancer    Anxiety disorder Sister     Depression Sister     Anxiety disorder Brother     Depression Brother     Other Paternal Grandmother         breast disorder    Diabetes Paternal Grandmother     Stomach cancer Paternal Grandmother     Thyroid disease Paternal Grandmother     Ovarian cancer Paternal Grandmother     Other Family         breast disorder    Stomach cancer Family     Thyroid disease Family     Ovarian cancer Family          Medications have been verified  Objective   /70   Pulse 67   Temp 98 2 °F (36 8 °C) (Temporal)   Resp 18   Ht 5' 8" (1 727 m)   Wt 85 7 kg (189 lb)   LMP 10/10/2019 (Approximate)   SpO2 100%   BMI 28 74 kg/m²        Physical Exam     Physical Exam   Constitutional: She appears well-developed and well-nourished  Cardiovascular: Normal rate and regular rhythm  Pulmonary/Chest: Effort normal and breath sounds normal    Musculoskeletal: She exhibits tenderness  She exhibits no edema or deformity          Right wrist: She exhibits decreased range of motion and tenderness  She exhibits no swelling, no effusion, no deformity and no laceration  +Ecchymosis on ulnar aspect of dorsal left wrist with TTP over this area   Skin: Skin is warm and dry  Capillary refill takes less than 2 seconds

## 2019-10-24 NOTE — PATIENT INSTRUCTIONS
May utilize Ace wrap on wrist   utilize Motrin for pain  Ice the wrist   followup with Orthopedics if symptoms do not improve after a week    Wrist Injury   WHAT YOU NEED TO KNOW:   A wrist injury happens when the tissues of your wrist joint are damaged  Your wrist joint is made up of tendons, ligaments, nerves, and bones  Two common types of injuries that can happen to your wrist are sprains and strains  A sprain can happen when the ligaments are stretched or torn  Ligaments are bands of elastic tissue that connect and hold the bones together  A strain happens when a tendon or muscle is overused, stretched, or torn  Tendons attach your hand and arm muscles to the bones of the wrist    DISCHARGE INSTRUCTIONS:   Medicines:   · NSAIDs:  These medicines decrease swelling, pain, and fever  NSAIDs are available without a doctor's order  Ask which medicine is right for you  Ask how much to take and when to take it  Take as directed  NSAIDs can cause stomach bleeding and kidney problems if not taken correctly  · Pain medicine: You may be given a prescription medicine to decrease pain  Do not wait until the pain is severe before you take this medicine  · Take your medicine as directed  Contact your healthcare provider if you think your medicine is not helping or if you have side effects  Tell him of her if you are allergic to any medicine  Keep a list of the medicines, vitamins, and herbs you take  Include the amounts, and when and why you take them  Bring the list or the pill bottles to follow-up visits  Carry your medicine list with you in case of an emergency  Follow up with your healthcare provider as directed:  Write down your questions so you remember to ask them during your visits  Manage your symptoms:   · Wrist supports:  A cast or splint may be put on your fingers, hand, and wrist to support your wrist and prevent further damage  Wear these as directed   Ask for instructions on how to bathe while you are wearing a splint or case  · Rest:  You may need to rest your wrist for at least 48 hours and avoid activities that cause pain  Ask what activities you should avoid and for how long  · Ice:  Ice helps decrease swelling and pain  Ice may also help prevent tissue damage  Use an ice pack or put crushed ice in a plastic bag  Cover it with a towel and place it on your injured wrist for 15 to 20 minutes every hour as directed  · Compression:  Your healthcare provider may suggest you wrap your wrist with an elastic bandage  This will help decrease swelling, support your wrist, and help it heal  Wear your wrist wrap as directed  Ask for instructions on how to wrap your wrist     · Elevation:  When you sit or lie down, keep your wrist at or above the level of your heart  This may help decrease pain and swelling  Physical therapy:  Your healthcare provider may recommend that you go to physical therapy  A physical therapist shows you how to do exercises that can help to strengthen your wrist and improve its range of movement  These exercises may also help decrease your pain  Prevent another wrist injury:   · Do strengthening exercises: Your healthcare provider or physical therapist may suggest that you do exercises to strengthen your hand and arm muscles  Ask when you may return to your regular physical activities or sports  If you start to exercise too soon it may cause you to injure your wrist again  · Protect your wrists:  Wrist guard splints or protective tape can help to support your wrist during exercise and sports  These devices may also keep your wrist from bending too far back  Ask for more information about the type of wrist support that you should use  Contact your healthcare provider if:   · You have a fever  · The bruising, swelling, or pain in your wrist gets worse  · You have questions or concerns about your condition or care    Return to the emergency department if:   · The skin on or near your wrist or hand feels cold, or it turns blue or white  · The skin on or near your wrist or hand is very tight, raised, and swollen  · You have new trouble moving and using your hands, fingers, or wrist     · Your wrist, hands, or fingers become swollen, red, numb, or they tingle  · Your wrist has any open wounds, including from surgery, that are red, swollen, warm, or have pus coming from them  © 2017 [address] [employer] Information is for End User's use only and may not be sold, redistributed or otherwise used for commercial purposes  All illustrations and images included in CareNotes® are the copyrighted property of [employer] M [employer]  or [employer]  The above information is an  only  It is not intended as medical advice for individual conditions or treatments  Talk to your doctor, nurse or pharmacist before following any medical regimen to see if it is safe and effective for you

## 2019-10-31 ENCOUNTER — TELEPHONE (OUTPATIENT)
Dept: OBGYN CLINIC | Facility: CLINIC | Age: 34
End: 2019-10-31

## 2019-10-31 NOTE — TELEPHONE ENCOUNTER
Incoming call from patient  States that she has been experiencing a lot of acne on her chest since starting Alysha  Has been on Lo Estrin in the past and did not have these symptoms  Would like to switch back  Starts new pack on Tuesday   Will route to provider, BARRY

## 2019-11-01 DIAGNOSIS — Z30.41 SURVEILLANCE OF CONTRACEPTIVE PILL: Primary | ICD-10-CM

## 2019-11-01 RX ORDER — NORETHINDRONE ACETATE AND ETHINYL ESTRADIOL 1; .02 MG/1; MG/1
1 TABLET ORAL DAILY
Qty: 90 TABLET | Refills: 4 | Status: SHIPPED | OUTPATIENT
Start: 2019-11-01 | End: 2020-06-29

## 2019-11-01 NOTE — TELEPHONE ENCOUNTER
contacted pt # [phone]-rec vvm- per hi[ppa communication consent on file - lmom advising as directed and to call bk with decision at my ext or main line provided

## 2019-11-01 NOTE — TELEPHONE ENCOUNTER
I will eRx Loestrin and cancel the Kaylynn rx if that is what she prefers   Please advise completing current pack prior to starting Loestrin

## 2019-11-01 NOTE — TELEPHONE ENCOUNTER
Pt returned my call  Pt agreed to the change and will do as advised  Pt stated script need to be sent to [facility] pharmacy on file for a 90 day supply due to her insurance only covers 90 days supplies

## 2019-12-16 DIAGNOSIS — Z30.41 SURVEILLANCE OF CONTRACEPTIVE PILL: ICD-10-CM

## 2019-12-16 RX ORDER — NORETHINDRONE ACETATE AND ETHINYL ESTRADIOL 1; .02 MG/1; MG/1
1 TABLET ORAL DAILY
Qty: 90 TABLET | Refills: 0 | Status: CANCELLED | OUTPATIENT
Start: 2019-12-16

## 2020-06-02 ENCOUNTER — APPOINTMENT (OUTPATIENT)
Dept: URGENT CARE | Age: 35
End: 2020-06-02
Payer: OTHER MISCELLANEOUS

## 2020-06-02 ENCOUNTER — APPOINTMENT (OUTPATIENT)
Dept: RADIOLOGY | Age: 35
End: 2020-06-02
Payer: OTHER MISCELLANEOUS

## 2020-06-02 DIAGNOSIS — S69.92XA INJURY OF FINGER OF LEFT HAND, INITIAL ENCOUNTER: Primary | ICD-10-CM

## 2020-06-02 DIAGNOSIS — S69.92XA INJURY OF FINGER OF LEFT HAND, INITIAL ENCOUNTER: ICD-10-CM

## 2020-06-02 PROCEDURE — 99284 EMERGENCY DEPT VISIT MOD MDM: CPT | Performed by: NURSE PRACTITIONER

## 2020-06-02 PROCEDURE — 73140 X-RAY EXAM OF FINGER(S): CPT

## 2020-06-02 PROCEDURE — G0383 LEV 4 HOSP TYPE B ED VISIT: HCPCS | Performed by: NURSE PRACTITIONER

## 2020-06-03 ENCOUNTER — APPOINTMENT (OUTPATIENT)
Dept: URGENT CARE | Age: 35
End: 2020-06-03
Payer: OTHER MISCELLANEOUS

## 2020-06-03 PROCEDURE — 99213 OFFICE O/P EST LOW 20 MIN: CPT | Performed by: PHYSICIAN ASSISTANT

## 2020-06-05 ENCOUNTER — APPOINTMENT (OUTPATIENT)
Dept: URGENT CARE | Age: 35
End: 2020-06-05
Payer: OTHER MISCELLANEOUS

## 2020-06-05 PROCEDURE — 99213 OFFICE O/P EST LOW 20 MIN: CPT | Performed by: FAMILY MEDICINE

## 2020-06-29 ENCOUNTER — ANNUAL EXAM (OUTPATIENT)
Dept: OBGYN CLINIC | Facility: MEDICAL CENTER | Age: 35
End: 2020-06-29
Payer: COMMERCIAL

## 2020-06-29 VITALS
SYSTOLIC BLOOD PRESSURE: 120 MMHG | HEIGHT: 68 IN | DIASTOLIC BLOOD PRESSURE: 78 MMHG | WEIGHT: 201 LBS | BODY MASS INDEX: 30.46 KG/M2

## 2020-06-29 DIAGNOSIS — Z01.419 ENCNTR FOR GYN EXAM (GENERAL) (ROUTINE) W/O ABN FINDINGS: ICD-10-CM

## 2020-06-29 PROCEDURE — 99395 PREV VISIT EST AGE 18-39: CPT | Performed by: PHYSICIAN ASSISTANT

## 2020-06-29 RX ORDER — DROSPIRENONE AND ETHINYL ESTRADIOL 0.03MG-3MG
1 KIT ORAL DAILY
Qty: 90 TABLET | Refills: 3 | Status: SHIPPED | OUTPATIENT
Start: 2020-06-29 | End: 2021-03-31 | Stop reason: SDUPTHER

## 2020-07-01 LAB
CYTOLOGIST CVX/VAG CYTO: NORMAL
DX ICD CODE: NORMAL
HPV I/H RISK 1 DNA CVX QL PROBE+SIG AMP: NEGATIVE
OTHER STN SPEC: NORMAL
PATH REPORT.FINAL DX SPEC: NORMAL
SL AMB NOTE:: NORMAL
SL AMB SPECIMEN ADEQUACY: NORMAL
SL AMB TEST METHODOLOGY: NORMAL

## 2021-03-10 DIAGNOSIS — Z23 ENCOUNTER FOR IMMUNIZATION: ICD-10-CM

## 2021-03-24 ENCOUNTER — IMMUNIZATIONS (OUTPATIENT)
Dept: FAMILY MEDICINE CLINIC | Facility: HOSPITAL | Age: 36
End: 2021-03-24

## 2021-03-24 DIAGNOSIS — Z23 ENCOUNTER FOR IMMUNIZATION: Primary | ICD-10-CM

## 2021-03-24 PROCEDURE — 91300 SARS-COV-2 / COVID-19 MRNA VACCINE (PFIZER-BIONTECH) 30 MCG: CPT

## 2021-03-24 PROCEDURE — 0001A SARS-COV-2 / COVID-19 MRNA VACCINE (PFIZER-BIONTECH) 30 MCG: CPT

## 2021-03-31 DIAGNOSIS — Z01.419 ENCNTR FOR GYN EXAM (GENERAL) (ROUTINE) W/O ABN FINDINGS: ICD-10-CM

## 2021-04-05 RX ORDER — DROSPIRENONE AND ETHINYL ESTRADIOL 0.03MG-3MG
1 KIT ORAL DAILY
Qty: 90 TABLET | Refills: 0 | Status: SHIPPED | OUTPATIENT
Start: 2021-04-05 | End: 2021-05-19

## 2021-04-17 ENCOUNTER — IMMUNIZATIONS (OUTPATIENT)
Dept: FAMILY MEDICINE CLINIC | Facility: HOSPITAL | Age: 36
End: 2021-04-17

## 2021-04-17 DIAGNOSIS — Z23 ENCOUNTER FOR IMMUNIZATION: Primary | ICD-10-CM

## 2021-04-17 PROCEDURE — 0002A SARS-COV-2 / COVID-19 MRNA VACCINE (PFIZER-BIONTECH) 30 MCG: CPT

## 2021-04-17 PROCEDURE — 91300 SARS-COV-2 / COVID-19 MRNA VACCINE (PFIZER-BIONTECH) 30 MCG: CPT

## 2021-05-19 DIAGNOSIS — Z01.419 ENCNTR FOR GYN EXAM (GENERAL) (ROUTINE) W/O ABN FINDINGS: ICD-10-CM

## 2021-05-19 RX ORDER — DROSPIRENONE AND ETHINYL ESTRADIOL 0.03MG-3MG
KIT ORAL
Qty: 84 TABLET | Refills: 0 | Status: SHIPPED | OUTPATIENT
Start: 2021-05-19 | End: 2021-09-01 | Stop reason: SDUPTHER

## 2021-08-16 ENCOUNTER — TELEPHONE (OUTPATIENT)
Dept: OBGYN CLINIC | Facility: CLINIC | Age: 36
End: 2021-08-16

## 2021-08-16 NOTE — TELEPHONE ENCOUNTER
----- Message from Myron Florez sent at 8/16/2021  5:50 AM EDT -----  Regarding: Non-Urgent Medical Question  Contact: [phone]  Good morning Kimberly Melgoza,     So my withdraw bleeding started a day later then usual, so as of this morning when I am writing I still have it  Should I still come to my appointment this afternoon at 4:40 or should I reschedule? Please let me know ASAP       Thank you   [facility]

## 2021-08-16 NOTE — TELEPHONE ENCOUNTER
She does need a Pap today, so probably better to reschedule so that we are sure we get a good specimen

## 2021-09-01 ENCOUNTER — ANNUAL EXAM (OUTPATIENT)
Dept: OBGYN CLINIC | Facility: CLINIC | Age: 36
End: 2021-09-01
Payer: COMMERCIAL

## 2021-09-01 VITALS
HEIGHT: 68 IN | BODY MASS INDEX: 31.37 KG/M2 | DIASTOLIC BLOOD PRESSURE: 74 MMHG | SYSTOLIC BLOOD PRESSURE: 120 MMHG | WEIGHT: 207 LBS

## 2021-09-01 DIAGNOSIS — Z01.419 ENCNTR FOR GYN EXAM (GENERAL) (ROUTINE) W/O ABN FINDINGS: ICD-10-CM

## 2021-09-01 PROCEDURE — G0476 HPV COMBO ASSAY CA SCREEN: HCPCS | Performed by: PHYSICIAN ASSISTANT

## 2021-09-01 PROCEDURE — G0145 SCR C/V CYTO,THINLAYER,RESCR: HCPCS | Performed by: PHYSICIAN ASSISTANT

## 2021-09-01 PROCEDURE — 99395 PREV VISIT EST AGE 18-39: CPT | Performed by: PHYSICIAN ASSISTANT

## 2021-09-01 RX ORDER — ERGOCALCIFEROL 1.25 MG/1
CAPSULE ORAL
COMMUNITY
Start: 2021-08-16

## 2021-09-01 RX ORDER — SULFASALAZINE 500 MG/1
1000 TABLET, DELAYED RELEASE ORAL 2 TIMES DAILY
COMMUNITY
Start: 2021-07-21

## 2021-09-01 RX ORDER — DROSPIRENONE AND ETHINYL ESTRADIOL 0.03MG-3MG
1 KIT ORAL DAILY
Qty: 84 TABLET | Refills: 4 | Status: SHIPPED | OUTPATIENT
Start: 2021-09-01

## 2021-09-01 RX ORDER — CYCLOBENZAPRINE HCL 10 MG
10 TABLET ORAL DAILY PRN
COMMUNITY
Start: 2021-08-17

## 2021-09-01 NOTE — PROGRESS NOTES
[address] [facility]  1985      CC:  Yearly exam    S:  30 y o  female here for yearly exam  Her cycles are regular, not heavy or crampy  Sexual activity: She is sexually active without pain, bleeding or dryness  Contraception: She uses Osman for contraception  Pap 6/29/20 neg/neg  Pap 3/25/19 neg/neg  Colpo 2/13/18 MARIPOSA 2-3, did not return for LEEP   Pap 1/2017 ASCUS +HPV 16    We reviewed ASCCP guidelines for Pap testing today  Family hx of breast cancer: no  Family hx of ovarian cancer: PGM  Family hx of colon cancer: MGM      Current Outpatient Medications:     cyclobenzaprine (FLEXERIL) 10 mg tablet, Take 10 mg by mouth daily as needed, Disp: , Rfl:     drospirenone-ethinyl estradiol (OSMAN) 3-0 03 MG per tablet, TAKE 1 TABLET BY MOUTH EVERY DAY, Disp: 84 tablet, Rfl: 0    ergocalciferol (VITAMIN D2) 50,000 units, TAKE 1 CAPSULE BY MOUTH ONE TIME PER WEEK, Disp: , Rfl:     ibuprofen (MOTRIN) 800 mg tablet, Take by mouth, Disp: , Rfl:     levocetirizine (XYZAL) 5 MG tablet, Take 5 mg by mouth every evening, Disp: , Rfl: 0    sulfaSALAzine (AZULFIDINE-EN) 500 mg EC tablet, Take 1,000 mg by mouth 2 (two) times a day, Disp: , Rfl:   Social History     Socioeconomic History    Marital status: /Civil Union     Spouse name: Not on file    Number of children: Not on file    Years of education: Not on file    Highest education level: Not on file   Occupational History    Not on file   Tobacco Use    Smoking status: Never Smoker    Smokeless tobacco: Never Used   Vaping Use    Vaping Use: Some days   Substance and Sexual Activity    Alcohol use:  Yes    Drug use: Not Currently    Sexual activity: Yes     Partners: Male     Birth control/protection: OCP   Other Topics Concern    Not on file   Social History Narrative    Not on file     Social Determinants of Health     Financial Resource Strain:     Difficulty of Paying Living Expenses:    Food Insecurity:     Worried About Running Out of Food in the Last Year:     Patrick of Food in the Last Year:    Transportation Needs:     Lack of Transportation (Medical):  Lack of Transportation (Non-Medical):    Physical Activity:     Days of Exercise per Week:     Minutes of Exercise per Session:    Stress:     Feeling of Stress :    Social Connections:     Frequency of Communication with Friends and Family:     Frequency of Social Gatherings with Friends and Family:     Attends Judaism Services:     Active Member of Clubs or Organizations:     Attends Club or Organization Meetings:     Marital Status:    Intimate Partner Violence:     Fear of Current or Ex-Partner:     Emotionally Abused:     Physically Abused:     Sexually Abused:      Family History   Problem Relation Age of Onset    Anxiety disorder Mother     Depression Mother     Arthritis Mother     Sleep disorder Mother     Bipolar disorder Mother     Asthma Mother     Diabetes Father     Other Father         bladder cancer    Cancer Father         Bladder cancer     Anxiety disorder Sister     Depression Sister     Asthma Sister     Anxiety disorder Brother     Depression Brother     Other Paternal Grandmother         breast disorder    Diabetes Paternal Grandmother     Stomach cancer Paternal Grandmother     Thyroid disease Paternal Grandmother     Ovarian cancer Paternal Grandmother     Other Family         breast disorder    Stomach cancer Family     Thyroid disease Family     Ovarian cancer Family     Colon cancer Maternal Grandmother     Stomach cancer Paternal Grandfather     Diabetes Paternal Grandfather       Past Medical History:   Diagnosis Date    Abnormal Pap smear of cervix     Anemia     Mild normocytic anemia    HPV (human papilloma virus) infection     Migraine         Review of Systems   Respiratory: Negative  Cardiovascular: Negative  Gastrointestinal: Negative for constipation and diarrhea  Genitourinary: Negative for difficulty urinating, pelvic pain, vaginal bleeding, vaginal discharge, itching or odor  O:  Blood pressure 120/74, height 5' 8 11" (1 73 m), weight 93 9 kg (207 lb), last menstrual period 08/14/2021  Patient appears well and is not in distress  Neck is supple without masses  Breasts are symmetrical without mass, tenderness, nipple discharge, skin changes or adenopathy  Abdomen is soft and nontender without masses  External genitals are normal without lesions or rashes  Urethral meatus and urethra are normal  Bladder is normal to palpation  Vagina is normal without discharge or bleeding  Cervix is normal without discharge or lesion  Mildly friable  Uterus is normal, mobile, nontender without palpable mass  Adnexa are normal, nontender, without palpable mass  A:  Yearly exam      P:   Pap and HPV today - if neg, repeat 3 years   Fariba sent to pharmacy     RTO one year for yearly exam or sooner as needed

## 2021-09-07 LAB
LAB AP GYN PRIMARY INTERPRETATION: NORMAL
Lab: NORMAL

## 2022-10-14 ENCOUNTER — ANNUAL EXAM (OUTPATIENT)
Dept: OBGYN CLINIC | Facility: MEDICAL CENTER | Age: 37
End: 2022-10-14
Payer: COMMERCIAL

## 2022-10-14 VITALS
DIASTOLIC BLOOD PRESSURE: 78 MMHG | SYSTOLIC BLOOD PRESSURE: 110 MMHG | HEIGHT: 68 IN | WEIGHT: 212.6 LBS | BODY MASS INDEX: 32.22 KG/M2

## 2022-10-14 DIAGNOSIS — Z01.419 ENCNTR FOR GYN EXAM (GENERAL) (ROUTINE) W/O ABN FINDINGS: ICD-10-CM

## 2022-10-14 DIAGNOSIS — Z01.419 ENCOUNTER FOR ANNUAL ROUTINE GYNECOLOGICAL EXAMINATION: Primary | ICD-10-CM

## 2022-10-14 DIAGNOSIS — Z71.85 HPV VACCINE COUNSELING: ICD-10-CM

## 2022-10-14 PROCEDURE — 99395 PREV VISIT EST AGE 18-39: CPT | Performed by: CLINICAL NURSE SPECIALIST

## 2022-10-14 RX ORDER — COVID-19 ANTIGEN TEST
KIT MISCELLANEOUS
COMMUNITY

## 2022-10-14 RX ORDER — DROSPIRENONE AND ETHINYL ESTRADIOL 0.03MG-3MG
1 KIT ORAL DAILY
Qty: 84 TABLET | Refills: 4 | Status: CANCELLED | OUTPATIENT
Start: 2022-10-14

## 2022-10-14 NOTE — ASSESSMENT & PLAN NOTE
Elton Saucedo couns >27  We discussed the new changes regarding HPV vaccine (AKA Gardasil) today and reviewed the following points  Until recently was only given up to age 32, now approved up to age 55  Unfortunately not all insurances have agreed to pay if given after age 32  Series of 3 injections over the course of 6 months (now, 1 and 6 months)   Primary purpose is the prevention of 9 high risk strains of HPV which are associated with cervical cancer as well as genital warts  Will protect against future exposure but cannot reverse exposure that has already occurred    Encouraged pt to check insurance for HPV/gardasil coverage and start series if agreeable

## 2022-10-14 NOTE — PROGRESS NOTES
Subjective:        Mayela Vizcaino is a 29 y o  female  Here for Gynecologic Exam      GYN HPI  Here for annual gyn exam  Regarding menstrual cycle: Patient denies menstrual complaints  Regular monthly menses, not excessive  She denies any abnormal vaginal complaints; and denies any abnormal urinary complaints    Denies changes or concerns r/t breasts  She sometimes performs self breast exams  She reports she generally eats a healthy diet and does not exercise regularly, active lifestyle  She does get dietary calcium/vit D  She denies any untreated or poorly controlled anxiety or depression sxs  She is sexually active  Contraception: OCP (estrogen/progesterone) and but planning to stopp   planning to use condoms  , She denies issues with intimacy  She reports that she does feel safe at home  The following portions of the patient's history were reviewed and updated as appropriate: allergies, current medications, past family history, past medical history, past social history, past surgical history, and problem list       Hereditary Cancer Screening  Family history reviewed and patient does not meet criteria for referral for genetic cancer assessment  Substance Abuse Screening Completed  See hx and flowsheet  Screens Positive for none       HEALTH MAINTENANCE SCREENINGS:    History of abnormal pap: Yes, 2018, Last Papanicolaou test:  09/01/2021    IMMUNIZATIONS  Gardasil HPV vaccine was not completed    Review of Systems   Constitutional: Negative for appetite change, chills, fatigue, fever and unexpected weight change  HENT: Negative  Eyes: Negative  Respiratory: Negative for chest tightness and shortness of breath  Cardiovascular: Negative for chest pain and palpitations  Gastrointestinal: Negative for abdominal pain, constipation and vomiting  Endocrine: Negative for cold intolerance and heat intolerance     Genitourinary:        As per HPI   Musculoskeletal: Negative for back pain, joint swelling and neck pain  Skin: Negative for color change and rash  Neurological: Negative for dizziness, weakness and numbness  Hematological: Does not bruise/bleed easily  Psychiatric/Behavioral: Negative  Objective:  /78 (BP Location: Left arm, Patient Position: Sitting, Cuff Size: Standard)   Ht 5' 7 5" (1 715 m)   Wt 96 4 kg (212 lb 9 6 oz)   LMP 09/21/2022 (Exact Date)   BMI 32 81 kg/m²        Physical Exam  Constitutional:       General: She is not in acute distress  Appearance: Normal appearance  Genitourinary:      Vulva and rectum normal       No lesions in the vagina  Right Labia: No rash or lesions  Left Labia: No lesions or rash  No vaginal discharge, erythema, tenderness or bleeding  Right Adnexa: not tender and no mass present  Left Adnexa: not tender and no mass present  No cervical motion tenderness, discharge or friability  Uterus is not enlarged or tender  No urethral prolapse present  Pelvic exam was performed with patient in the lithotomy position  Breasts: Breasts are symmetrical       Right: No inverted nipple, mass, nipple discharge, skin change or tenderness  Left: No inverted nipple, mass, nipple discharge, skin change or tenderness  HENT:      Head: Normocephalic and atraumatic  Cardiovascular:      Rate and Rhythm: Normal rate  Heart sounds: No murmur heard  Pulmonary:      Effort: Pulmonary effort is normal       Breath sounds: Normal breath sounds  Abdominal:      General: There is no distension  Palpations: Abdomen is soft  Tenderness: There is no abdominal tenderness  Musculoskeletal:         General: Normal range of motion  Cervical back: Normal range of motion  Lymphadenopathy:      Cervical: No cervical adenopathy  Neurological:      Mental Status: She is alert and oriented to person, place, and time  Skin:     General: Skin is warm and dry  Psychiatric:         Mood and Affect: Mood normal          Behavior: Behavior normal    Vitals reviewed  Assessment/Plan:           [address]  Annual GYN examination completed today  Risk prevention and anticipatory guidance provided including:  • Pap/breast screenings- see below  Risk for hereditary cancers: Family history reviewed and patient does qualify for referral for genetics consult/testing  Referral to genetics oncology offered as indicated  • Calcium and vitamin D supplementation  • Risk factors for lipid, diabetes and thryroid screening, ordered if needed  • Dietary and lifestyle recommendations based on her age and weight  body mass index is 32 81 kg/m²       • PHQ-2 depression screen completed  Reviewed and interventions recommended if needed  • Tobacco and alcohol use, intervention ordered if applicable  Cervical cancer screening  Previous pap smears and ASCCP screening guidelines have been reviewed  Pap smear is indicated at this time  Contraception   Currently on junel  No signficant adverse effects  Not refilled as she is considering stopping this  STI Screening  STI screening is declined  STI prevention discussed with use of condoms  Breast exam and breast cancer screening  Breast exam was done; , breast cancer imaging/screening is not indicated at this time  Problem List Items Addressed This Visit     HPV vaccine counseling     Eduarda Vegas couns >27  We discussed the new changes regarding HPV vaccine (AKA Gardasil) today and reviewed the following points  Until recently was only given up to age 32, now approved up to age 55  Unfortunately not all insurances have agreed to pay if given after age 32  Series of 3 injections over the course of 6 months (now, 1 and 6 months)   Primary purpose is the prevention of 9 high risk strains of HPV which are associated with cervical cancer as well as genital warts    Will protect against future exposure but cannot reverse exposure that has already occurred    Encouraged pt to check insurance for HPV/gardasil coverage and start series if agreeable  Other Visit Diagnoses     Encounter for annual routine gynecological examination    -  Primary    Encntr for gyn exam (general) (routine) w/o abn findings              No orders of the defined types were placed in this encounter

## 2022-10-14 NOTE — PROGRESS NOTES
Patient presents for a routine annual visit  Menarche- 16 Y/O  Last Pap Smear- 2021 -/- hx abn w/ colpo 2018  LMP- 22  Birth control- peyman    Non smoker  Social drinker  Currently sexually active  No family history of breast cancer   + family hx colon cancer, unsure of which gyn cancers  No concerns/questions for today's visit

## 2022-10-14 NOTE — PATIENT INSTRUCTIONS
We discussed the new changes regarding HPV vaccine (AKA Gardasil) today  Series of 3 injections over the course of 6 months (now, 1 and 6 months)   Primary purpose is the prevention of 9 high risk strains of HPV which are associated with cervical cancer as well as genital warts  Will protect against future exposure but cannot reverse exposure that has already occurred    Until recently was only given up to age 32, now approved up to age 52  Unfortunately not all insurances have agreed to pay if given after age 32  I think this could benefit you  Please check insurance for HPV/gardasil coverage   If covered please call us back to set up appts for the injections

## 2023-05-20 ENCOUNTER — OFFICE VISIT (OUTPATIENT)
Dept: URGENT CARE | Facility: MEDICAL CENTER | Age: 38
End: 2023-05-20

## 2023-05-20 VITALS
SYSTOLIC BLOOD PRESSURE: 134 MMHG | RESPIRATION RATE: 18 BRPM | HEART RATE: 92 BPM | BODY MASS INDEX: 33.65 KG/M2 | TEMPERATURE: 98.5 F | DIASTOLIC BLOOD PRESSURE: 95 MMHG | WEIGHT: 222 LBS | OXYGEN SATURATION: 96 % | HEIGHT: 68 IN

## 2023-05-20 DIAGNOSIS — L30.9 DERMATITIS: Primary | ICD-10-CM

## 2023-05-20 RX ORDER — METHYLPREDNISOLONE 4 MG/1
TABLET ORAL
Qty: 21 TABLET | Refills: 0 | Status: SHIPPED | OUTPATIENT
Start: 2023-05-20

## 2023-05-20 NOTE — PROGRESS NOTES
[address] [facility]        NAME: Fang Griffin is a 40 y o  female  : 1985    MRN: [id number]  DATE: May 20, 2023  TIME: 4:16 PM    Assessment and Plan   Dermatitis [L30 9]  1  Dermatitis  methylPREDNISolone 4 MG tablet therapy pack            Patient Instructions     Dermatitis  Medrol dose pack as directed  Follow up with PCP in 3-5 days  Proceed to  ER if symptoms worsen  Chief Complaint     Chief Complaint   Patient presents with   • Rash     Started Tuesday with red spots around arm pit, itched it and became bruised  Started now on chest, abdominal area  Hasn't done anything new or different  Is on an inflammation medication Sulfasalazine that she had missed 2-3 doses  Did have poison ivy for a month and finished steroid on Monday  History of Present Illness       56-year-old female who presents complaining of itchy rash to upper extremities  Patient states that she does not recall using any new soaps, detergents, foods, lotions  Denies swelling of lips, shortness of breath  Review of Systems   Review of Systems   Constitutional: Negative  HENT: Negative  Eyes: Negative  Respiratory: Negative  Negative for cough, chest tightness, shortness of breath, wheezing and stridor  Cardiovascular: Negative  Negative for chest pain, palpitations and leg swelling  Skin: Positive for rash           Current Medications       Current Outpatient Medications:   •  cyclobenzaprine (FLEXERIL) 10 mg tablet, Take 10 mg by mouth daily as needed, Disp: , Rfl:   •  ergocalciferol (VITAMIN D2) 50,000 units, TAKE 1 CAPSULE BY MOUTH ONE TIME PER WEEK, Disp: , Rfl:   •  levocetirizine (XYZAL) 5 MG tablet, Take 5 mg by mouth every evening, Disp: , Rfl: 0  •  methylPREDNISolone 4 MG tablet therapy pack, Use as directed on package, Disp: 21 tablet, Rfl: 0  •  Naproxen Sodium 220 MG CAPS, Take by mouth, Disp: , Rfl:   •  sulfaSALAzine (AZULFIDINE-EN) 500 mg EC tablet, Take 1,000 mg by mouth 2 (two) times a day, Disp: , Rfl:   •  ibuprofen (MOTRIN) 800 mg tablet, Take by mouth (Patient not taking: Reported on 10/14/2022), Disp: , Rfl:     Current Allergies     Allergies as of 05/20/2023 - Reviewed 05/20/2023   Allergen Reaction Noted   • Amoxicillin  09/13/2016   • Lidocaine Rash 09/13/2016   • Wound dressing adhesive Itching and Rash 09/07/2022            The following portions of the patient's history were reviewed and updated as appropriate: allergies, current medications, past family history, past medical history, past social history, past surgical history and problem list      Past Medical History:   Diagnosis Date   • Abnormal Pap smear of cervix    • Anemia     Mild normocytic anemia   • Asthma 2018   • HPV (human papilloma virus) infection    • Migraine    • Varicella        Past Surgical History:   Procedure Laterality Date   • CERVICAL BIOPSY  W/ LOOP ELECTRODE EXCISION     • COLPOSCOPY  02/13/2018    MARIPOSA 2-3   • WISDOM TOOTH EXTRACTION  07/2004       Family History   Problem Relation Age of Onset   • Anxiety disorder Mother    • Depression Mother    • Arthritis Mother    • Sleep disorder Mother    • Bipolar disorder Mother    • Asthma Mother    • Diabetes Father    • Other Father         bladder cancer   • Cancer Father         Urinary bladder cancer   • Stroke Father    • Anxiety disorder Sister    • Depression Sister    • Asthma Sister    • Migraines Sister    • Anxiety disorder Brother    • Depression Brother    • Colon cancer Maternal Grandmother    • Thyroid disease Maternal Grandmother    • Other Paternal Grandmother         breast disorder   • Diabetes Paternal Grandmother    • Stomach cancer Paternal Grandmother    • Thyroid disease Paternal Grandmother    • Ovarian cancer Paternal Grandmother    • Stomach cancer Paternal Grandfather    • Diabetes Paternal Grandfather    • Cancer Paternal Grandfather         Stomach cancer   • Other Family         breast disorder   • Stomach cancer "Family    • Thyroid disease Family    • Ovarian cancer Family    • Breast cancer Neg Hx          Medications have been verified  Objective   /95   Pulse 92   Temp 98 5 °F (36 9 °C) (Temporal)   Resp 18   Ht 5' 8\" (1 727 m)   Wt 101 kg (222 lb)   SpO2 96%   BMI 33 75 kg/m²        Physical Exam     Physical Exam  Constitutional:       Appearance: She is well-developed  HENT:      Head: Normocephalic and atraumatic  Right Ear: External ear normal       Left Ear: External ear normal       Nose: Nose normal       Mouth/Throat:      Pharynx: No oropharyngeal exudate  Cardiovascular:      Rate and Rhythm: Normal rate and regular rhythm  Heart sounds: Normal heart sounds  Pulmonary:      Effort: Pulmonary effort is normal  No respiratory distress  Breath sounds: Normal breath sounds  No wheezing or rales  Chest:      Chest wall: No tenderness  Musculoskeletal:      Cervical back: Normal range of motion and neck supple  Lymphadenopathy:      Cervical: No cervical adenopathy  Skin:     Findings: Rash present  Rash is macular and papular                     "

## 2023-05-20 NOTE — PATIENT INSTRUCTIONS
Dermatitis  Medrol dose pack as directed  Follow up with PCP in 3-5 days  Proceed to  ER if symptoms worsen

## 2023-10-24 NOTE — PROGRESS NOTES
Patient presents for a routine annual visit  Last Pap Smear- 2021 neg/neg   Pap today  HPV vaccine- not completed   LMP- pt has not had a period for a year   Birth control- none    nonSmoker  Currently sexually active - one partner   Declines STD testing  No family history of uterine, cervical or breast cancer  MGM ovarian  No concerns/questions for today's visit

## 2023-10-26 ENCOUNTER — ANNUAL EXAM (OUTPATIENT)
Dept: OBGYN CLINIC | Facility: MEDICAL CENTER | Age: 38
End: 2023-10-26
Payer: COMMERCIAL

## 2023-10-26 VITALS
SYSTOLIC BLOOD PRESSURE: 130 MMHG | WEIGHT: 228 LBS | BODY MASS INDEX: 34.56 KG/M2 | HEIGHT: 68 IN | DIASTOLIC BLOOD PRESSURE: 86 MMHG

## 2023-10-26 DIAGNOSIS — Z30.09 BIRTH CONTROL COUNSELING: ICD-10-CM

## 2023-10-26 DIAGNOSIS — N91.2 AMENORRHEA: ICD-10-CM

## 2023-10-26 DIAGNOSIS — Z01.419 ENCNTR FOR GYN EXAM (GENERAL) (ROUTINE) W/O ABN FINDINGS: Primary | ICD-10-CM

## 2023-10-26 LAB — SL AMB POCT URINE HCG: NORMAL

## 2023-10-26 PROCEDURE — G0145 SCR C/V CYTO,THINLAYER,RESCR: HCPCS | Performed by: PHYSICIAN ASSISTANT

## 2023-10-26 PROCEDURE — 99395 PREV VISIT EST AGE 18-39: CPT | Performed by: PHYSICIAN ASSISTANT

## 2023-10-26 PROCEDURE — 81025 URINE PREGNANCY TEST: CPT | Performed by: PHYSICIAN ASSISTANT

## 2023-10-26 PROCEDURE — G0476 HPV COMBO ASSAY CA SCREEN: HCPCS | Performed by: PHYSICIAN ASSISTANT

## 2023-10-26 RX ORDER — MEDROXYPROGESTERONE ACETATE 10 MG/1
TABLET ORAL
Qty: 10 TABLET | Refills: 3 | Status: SHIPPED | OUTPATIENT
Start: 2023-10-26

## 2023-10-26 NOTE — PROGRESS NOTES
Assessment   40 y.o. Michelle Reyes presenting for annual exam.     Plan   Diagnoses and all orders for this visit:    Birth control counseling  -     POCT urine HCG    Amenorrhea  -     TSH, 3rd generation with Free T4 reflex; Future  -     Prolactin; Future  -     Testosterone; Future  -     17-Hydroxyprogesterone; Future  -     Follicle stimulating hormone; Future  -     Estradiol; Future  -     Antimullerian hormone (AMH); Future  -     Insulin, fasting; Future  -     Glucose, fasting; Future  -     DHEA-sulfate; Future  -     US pelvis complete w transvaginal; Future  -     medroxyPROGESTERone (PROVERA) 10 mg tablet; Take one tablet orally for ten days if no menses for 90 days or greater. Expect a withdrawal bleed after completing medication course    Encntr for gyn exam (general) (routine) w/o abn findings  -     Cancel: Liquid-based pap, screening  -     Liquid-based pap, screening        Pap collected today. 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 Follow up paps were negative  Amenorrhea- negative hcg in office today. Course of Provera given to patient to initiate menses. Advised her to complete labs as well as TVUS to work up amenorrhea. They are considering pregnancy but not interested in infertility referral at this time. Will check basic infertility labs. Advised cycling with Provera every 90 days. Reviewed the importance of q90 provera for endometrial protection. F/u based on labs and US    Perineal hygiene reviewed. Weight bearing exercises minium of 150 mins/weekly advised. Kegel exercises recommended. SBE encouraged, A yearly mammogram is recommended for breast cancer screening starting at age 36. ASCCP guidelines reviewed. Condoms encouraged with all sexual activity to prevent STI's. Gardisil vaccines recommended up to age 39. Calcium/ Vit D dietary requirements discussed. Advised to call with any issues, all concerns & questions addressed.    See provided information in your after visit summary F/U Annually and PRN    Results will be released to 1calendar, if abnormal will call or message to review and discuss treatment plan.     __________________________________________________________________    Subjective     Reji Dover is a 40 y.o. Odalys Hunter presenting for annual exam.     She reports a long standing hx of amenorrhea. Reports it has been almost one year since her last menses. She stopped OCPs last  then had one normal menstrual cycle followed by months of amenorrhea. She does not wish to restart birth control. She is hoping for pregnancy but is not interested in infertility referral due to cost and recent loss of her job. Would accept infertility labs    She has a hx of CIN2-3 on colpo in 2018. She was advised to follow up for LEEP procedure which she never completed. Follow up paps since 2018 have been collectively negative    SCREENING  Last Pap: 2021 neg/neg    Pap Hx   2017 pap- ASCUS with +16 HPV  2018 colpo- MARIPOSA 2-3  Did not complete LEEP  3/25/2019 pap- neg/neg  2020 pap- neg/neg  2021 pap- neg/neg      GYN  Sexually active: Yes - single partner - male  Contraception: nothing     Hx Abnormal pap: see above  We reviewed ASCCP guidelines for Pap testing today. Denies vaginal discharge, itching, odor, dyspareunia, pelvic pain and vulvar/vaginal symptoms      OB           Complaints: denies   Denies urgency, frequency, hematuria, leakage / change in stream, difficulty urinating. BREAST  Complaints: denies   Denies: breast lump, breast tenderness, nipple discharge, skin color change, and skin lesion(s)  Personal hx: none      Pertinent Family Hx:   Family hx of breast cancer: no  Family hx of ovarian cancer: [facility]  [place] [facility] [place]      GENERAL  PMH reviewed/updated and is as below. Patient has not followed with a PCP in while.     SOCIAL  Smoking: no  Alcohol:social  Drug: no  Occupation:previously worked as [employer] but [employer] location has closed; she is currently searching for a new job      Past Medical History:   Diagnosis Date    Abnormal Pap smear of cervix     Anemia     Mild normocytic anemia    Asthma 2018    HPV (human papilloma virus) infection     Migraine     Varicella        Past Surgical History:   Procedure Laterality Date    CERVICAL BIOPSY  W/ LOOP ELECTRODE EXCISION      COLPOSCOPY  02/13/2018    MARIPOSA 2-3    WISDOM TOOTH EXTRACTION  07/2004         Current Outpatient Medications:     cyclobenzaprine (FLEXERIL) 10 mg tablet, Take 10 mg by mouth daily as needed, Disp: , Rfl:     ergocalciferol (VITAMIN D2) 50,000 units, TAKE 1 CAPSULE BY MOUTH ONE TIME PER WEEK, Disp: , Rfl:     levocetirizine (XYZAL) 5 MG tablet, Take 5 mg by mouth every evening, Disp: , Rfl: 0    ibuprofen (MOTRIN) 800 mg tablet, Take by mouth (Patient not taking: Reported on 10/14/2022), Disp: , Rfl:     methylPREDNISolone 4 MG tablet therapy pack, Use as directed on package, Disp: 21 tablet, Rfl: 0    Naproxen Sodium 220 MG CAPS, Take by mouth, Disp: , Rfl:     sulfaSALAzine (AZULFIDINE-EN) 500 mg EC tablet, Take 1,000 mg by mouth 2 (two) times a day (Patient not taking: Reported on 10/26/2023), Disp: , Rfl:     Allergies   Allergen Reactions    Amoxicillin     Lidocaine Rash    Wound Dressing Adhesive Itching and Rash       Social History     Socioeconomic History    Marital status: /Civil Union     Spouse name: Not on file    Number of children: Not on file    Years of education: Not on file    Highest education level: Not on file   Occupational History    Not on file   Tobacco Use    Smoking status: Never    Smokeless tobacco: Never   Vaping Use    Vaping Use: Some days   Substance and Sexual Activity    Alcohol use:  Yes     Alcohol/week: 2.0 standard drinks of alcohol     Types: 2 Standard drinks or equivalent per week    Drug use: Never    Sexual activity: Yes     Partners: Male     Birth control/protection: None     Comment: Pill   Other Topics Concern    Not on file   Social History Narrative    Not on file     Social Determinants of Health     Financial Resource Strain: Not on file   Food Insecurity: Not on file   Transportation Needs: Not on file   Physical Activity: Not on file   Stress: Not on file   Social Connections: Not on file   Intimate Partner Violence: Not on file   Housing Stability: Not on file       Review of Systems     ROS:  Constitutional: Negative for fatigue and unexpected weight change. Respiratory: Negative for cough and shortness of breath. Cardiovascular: Negative for chest pain and palpitations. Gastrointestinal: Negative for abdominal pain and change in bowel habits  Breasts:  Negative, other than as noted above. Genitourinary: Negative, other than as noted above. Psychiatric: Negative for mood difficulties. Objective      /86 (BP Location: Left arm, Patient Position: Sitting, Cuff Size: Large)   Ht 5' 8" (1.727 m)   Wt 103 kg (228 lb)   LMP 11/01/2022 (Approximate)   BMI 34.67 kg/m²     Physical Examination:    Patient appears well and is not in distress  Neck is supple without masses, no cervical or supraclavicular lymphadenopathy  Cardiovascular: regular rate and rhythm; no murmurs  Lungs: clear to auscultation bilaterally; no wheezes  Breasts are symmetrical without mass, tenderness, nipple discharge, skin changes or adenopathy. Abdomen is soft and nontender without masses. External genitals are normal without lesions or rashes. Urethral meatus and urethra are normal  Bladder is normal to palpation  Vagina is normal without discharge or bleeding. Cervix is normal without discharge or lesion. Uterus is normal, mobile, nontender without palpable mass. Adnexa are normal, nontender, without palpable mass.

## 2023-11-01 LAB
LAB AP GYN PRIMARY INTERPRETATION: NORMAL
Lab: NORMAL

## 2023-11-09 ENCOUNTER — TELEPHONE (OUTPATIENT)
Dept: OBGYN CLINIC | Facility: CLINIC | Age: 38
End: 2023-11-09

## 2023-12-26 ENCOUNTER — APPOINTMENT (OUTPATIENT)
Dept: LAB | Facility: MEDICAL CENTER | Age: 38
End: 2023-12-26

## 2023-12-26 DIAGNOSIS — Z02.1 PHYSICAL EXAM, PRE-EMPLOYMENT: ICD-10-CM

## 2023-12-26 PROCEDURE — 86480 TB TEST CELL IMMUN MEASURE: CPT

## 2023-12-27 LAB
GAMMA INTERFERON BACKGROUND BLD IA-ACNC: 0 IU/ML
M TB IFN-G BLD-IMP: NEGATIVE
M TB IFN-G CD4+ BCKGRND COR BLD-ACNC: 0 IU/ML
M TB IFN-G CD4+ BCKGRND COR BLD-ACNC: 0.01 IU/ML
MITOGEN IGNF BCKGRD COR BLD-ACNC: 10 IU/ML

## 2024-01-22 DIAGNOSIS — Z00.6 ENCOUNTER FOR EXAMINATION FOR NORMAL COMPARISON OR CONTROL IN CLINICAL RESEARCH PROGRAM: ICD-10-CM

## 2024-02-06 ENCOUNTER — APPOINTMENT (OUTPATIENT)
Dept: LAB | Facility: MEDICAL CENTER | Age: 39
End: 2024-02-06
Payer: COMMERCIAL

## 2024-02-06 DIAGNOSIS — Z00.6 ENCOUNTER FOR EXAMINATION FOR NORMAL COMPARISON OR CONTROL IN CLINICAL RESEARCH PROGRAM: ICD-10-CM

## 2024-02-06 DIAGNOSIS — N91.2 AMENORRHEA: ICD-10-CM

## 2024-02-06 PROCEDURE — 36415 COLL VENOUS BLD VENIPUNCTURE: CPT

## 2024-02-10 ENCOUNTER — APPOINTMENT (OUTPATIENT)
Dept: LAB | Facility: MEDICAL CENTER | Age: 39
End: 2024-02-10
Payer: COMMERCIAL

## 2024-02-10 DIAGNOSIS — M46.1 SACROILIITIS, NOT ELSEWHERE CLASSIFIED (HCC): ICD-10-CM

## 2024-02-10 LAB
CRP SERPL QL: 6.1 MG/L
ERYTHROCYTE [SEDIMENTATION RATE] IN BLOOD: 13 MM/HOUR (ref 0–19)
ESTRADIOL SERPL-MCNC: 44.9 PG/ML
FSH SERPL-ACNC: 5.2 MIU/ML
GLUCOSE P FAST SERPL-MCNC: 88 MG/DL (ref 65–99)
INSULIN SERPL-ACNC: 7.16 UIU/ML (ref 1.9–23)
PROLACTIN SERPL-MCNC: 9.21 NG/ML (ref 3.34–26.72)
TESTOST SERPL-MSCNC: 88 NG/DL
TSH SERPL DL<=0.05 MIU/L-ACNC: 1.4 UIU/ML (ref 0.45–4.5)

## 2024-02-10 PROCEDURE — 82627 DEHYDROEPIANDROSTERONE: CPT

## 2024-02-10 PROCEDURE — 83525 ASSAY OF INSULIN: CPT

## 2024-02-10 PROCEDURE — 85652 RBC SED RATE AUTOMATED: CPT

## 2024-02-10 PROCEDURE — 84146 ASSAY OF PROLACTIN: CPT

## 2024-02-10 PROCEDURE — 84443 ASSAY THYROID STIM HORMONE: CPT

## 2024-02-10 PROCEDURE — 86140 C-REACTIVE PROTEIN: CPT

## 2024-02-10 PROCEDURE — 82670 ASSAY OF TOTAL ESTRADIOL: CPT

## 2024-02-10 PROCEDURE — 84403 ASSAY OF TOTAL TESTOSTERONE: CPT

## 2024-02-10 PROCEDURE — 83498 ASY HYDROXYPROGESTERONE 17-D: CPT

## 2024-02-10 PROCEDURE — 82947 ASSAY GLUCOSE BLOOD QUANT: CPT

## 2024-02-10 PROCEDURE — 82166 ASSAY ANTI-MULLERIAN HORM: CPT

## 2024-02-10 PROCEDURE — 83001 ASSAY OF GONADOTROPIN (FSH): CPT

## 2024-02-10 PROCEDURE — 36415 COLL VENOUS BLD VENIPUNCTURE: CPT

## 2024-02-12 LAB — DHEA-S SERPL-MCNC: 351 UG/DL (ref 57.3–279.2)

## 2024-02-14 LAB — 17OHP SERPL-MCNC: 45 NG/DL

## 2024-02-15 ENCOUNTER — TELEPHONE (OUTPATIENT)
Dept: OBGYN CLINIC | Facility: CLINIC | Age: 39
End: 2024-02-15

## 2024-02-15 LAB — MIS SERPL-MCNC: 12.4 NG/ML

## 2024-02-15 NOTE — TELEPHONE ENCOUNTER
----- Message from Nemo Nichols PA-C sent at 2/15/2024 10:20 AM EST -----  Please call patient  and let her know I received the results of her labs. I would like her to make an appointment with me to review her labwork. I would also like her to complete the ultrasound that I ordered before our follow up visit together

## 2024-02-15 NOTE — TELEPHONE ENCOUNTER
Left message for patient to schedule US appointment with central scheduling and to then make a follow up appointment about 2 weeks after US appointment (to ensure results are finalized) to discuss results with SANDOR.

## 2024-02-29 ENCOUNTER — OFFICE VISIT (OUTPATIENT)
Dept: FAMILY MEDICINE CLINIC | Facility: CLINIC | Age: 39
End: 2024-02-29
Payer: COMMERCIAL

## 2024-02-29 VITALS
TEMPERATURE: 97.5 F | DIASTOLIC BLOOD PRESSURE: 86 MMHG | WEIGHT: 234.2 LBS | HEART RATE: 95 BPM | BODY MASS INDEX: 36.76 KG/M2 | SYSTOLIC BLOOD PRESSURE: 110 MMHG | OXYGEN SATURATION: 99 % | HEIGHT: 67 IN

## 2024-02-29 DIAGNOSIS — G43.009 MIGRAINE WITHOUT AURA AND WITHOUT STATUS MIGRAINOSUS, NOT INTRACTABLE: Primary | ICD-10-CM

## 2024-02-29 PROCEDURE — 99203 OFFICE O/P NEW LOW 30 MIN: CPT | Performed by: FAMILY MEDICINE

## 2024-02-29 RX ORDER — MELOXICAM 15 MG/1
15 TABLET ORAL DAILY
COMMUNITY

## 2024-02-29 RX ORDER — SUMATRIPTAN 100 MG/1
100 TABLET, FILM COATED ORAL ONCE AS NEEDED
Qty: 10 TABLET | Refills: 1 | Status: SHIPPED | OUTPATIENT
Start: 2024-02-29

## 2024-02-29 NOTE — PROGRESS NOTES
Name: Sally Kate      : 1985      MRN: [id number]  Encounter Provider: Moose Hutchinson MD  Encounter Date: 2024   Encounter department: [facility]    Assessment & Plan     1. Migraine without aura and without status migrainosus, not intractable  -     SUMAtriptan (IMITREX) 100 mg tablet; Take 1 tablet (100 mg total) by mouth once as needed for migraine for up to 1 dose may repeat in 2 hours if necessary. Max dose 200mg in 24 hour period.      Patient's migraine appears to be severe but infrequent, patient would benefit from abortive treatment rather than preventative  She may try Imitrex 100 mg tablets at the initial onset of migraine, this may help prevent migraine,  Take 1 tablet at a time if there is no improvement in 2 hours take the second dose  Do not take more than 200 mg in 24 hours.    If patient has intractable migraine please report to the office we may try Toradol shot         Subjective     HPI    38-year-old female patient presents to \[facility]\"" care.  Patient's main concern today is regarding chronic migraines.  Patient reports she does have episodic migraines, not frequently but usually lasting a week or more.  Most recent episode was back on the week before , the other episode was in the second week of August.  For those 2 episodes symptoms lasted approximately 1 week.  Migraine episode has been significant enough that for patient to work for weeks at a time.  She has a piercing which seems to be helping with migraine.  Patient had difficulty keeping food/water down so had difficulty with medication during these episodes.    Patient is unaware of any specific triggers for migraine.  This denies any significant visual changes during migraine episodes.  Denies any blood clots, patient does not use tobacco products.    He does see a rheumatologist for chronic inflammation, her most recent CRP was elevated although her ESR was within normal range.  She is  using Mobic 15 mg once a day.    Migraines started approximately around 2011.  Patient does have a history of head trauma, around 14-year-old patient fell of multiple 5 stairs striking her head against concrete nenita.    Denies any motor vehicle accidents.  No history of seizures.    Worst migraine symptoms 10 out of 10.    She reports ibuprofen does seem to help with pain however he she will have to take 4 out of 5.    Patient denies any nighttime awakening due to the migraine.  However migraine seems to be worse in the morning.    Patient does consume caffeine.  Drinks approximately 20 once a day.    Patient reports most recent migraine started on the right side, radiated to the back of the head at the left side.  Patient reports the pain feels like sharp, pounding and pressure.          Review of Systems   Constitutional:  Negative for chills and fever.   HENT:  Negative for congestion, rhinorrhea and sore throat.    Respiratory:  Negative for shortness of breath.    Cardiovascular:  Negative for chest pain.   Gastrointestinal:  Negative for abdominal pain, constipation, diarrhea, nausea and vomiting.   Neurological:  Positive for numbness (big toe) and headaches. Negative for dizziness, seizures, syncope, weakness and light-headedness.   Psychiatric/Behavioral:  Positive for sleep disturbance (light sleeper).         Snoring       Past Medical History:   Diagnosis Date    Abnormal Pap smear of cervix     Anemia     Mild normocytic anemia    Asthma 2018    HPV (human papilloma virus) infection     Migraine     Varicella      Past Surgical History:   Procedure Laterality Date    COLPOSCOPY  02/13/2018    MARIPOSA 2-3    WISDOM TOOTH EXTRACTION  07/2004     Family History   Problem Relation Age of Onset    Cancer Mother         ?pancreatic    Anxiety disorder Mother     Depression Mother     Arthritis Mother     Sleep disorder Mother     Bipolar disorder Mother     Asthma Mother     Diabetes Father     Other Father          bladder cancer    Cancer Father         Urinary bladder cancer    Stroke Father     Anxiety disorder Sister     Depression Sister     Asthma Sister     Migraines Sister     Anxiety disorder Brother     Depression Brother     Colon cancer Maternal Grandmother     Thyroid disease Maternal Grandmother     Other Paternal Grandmother         breast disorder    Diabetes Paternal Grandmother     Stomach cancer Paternal Grandmother     Thyroid disease Paternal Grandmother     Ovarian cancer Paternal Grandmother     Stomach cancer Paternal Grandfather     Diabetes Paternal Grandfather     Cancer Paternal Grandfather         Stomach cancer    Other Family         breast disorder    Stomach cancer Family     Thyroid disease Family     Ovarian cancer Family     Breast cancer Neg Hx      Social History     Socioeconomic History    Marital status: /Civil Union     Spouse name: None    Number of children: None    Years of education: None    Highest education level: None   Occupational History    None   Tobacco Use    Smoking status: Never     Passive exposure: Past    Smokeless tobacco: Never   Vaping Use    Vaping status: Never Used   Substance and Sexual Activity    Alcohol use: Yes     Alcohol/week: 2.0 standard drinks of alcohol     Types: 2 Standard drinks or equivalent per week     Comment: occ    Drug use: Never    Sexual activity: Yes     Partners: Male     Birth control/protection: None     Comment: Pill   Other Topics Concern    None   Social History Narrative    None     Social Determinants of Health     Financial Resource Strain: Not on file   Food Insecurity: Not on file   Transportation Needs: Not on file   Physical Activity: Not on file   Stress: Not on file   Social Connections: Not on file   Intimate Partner Violence: Not on file   Housing Stability: Not on file     Current Outpatient Medications on File Prior to Visit   Medication Sig    cyclobenzaprine (FLEXERIL) 10 mg tablet Take 10 mg by mouth  "daily as needed    ergocalciferol (VITAMIN D2) 50,000 units TAKE 1 CAPSULE BY MOUTH ONE TIME PER WEEK    levocetirizine (XYZAL) 5 MG tablet Take 5 mg by mouth every evening    medroxyPROGESTERone (PROVERA) 10 mg tablet Take one tablet orally for ten days if no menses for 90 days or greater. Expect a withdrawal bleed after completing medication course    meloxicam (Mobic) 15 mg tablet Take 15 mg by mouth daily    methylPREDNISolone 4 MG tablet therapy pack Use as directed on package    sulfaSALAzine (AZULFIDINE-EN) 500 mg EC tablet Take 1,000 mg by mouth 2 (two) times a day (Patient not taking: Reported on 10/26/2023)    [DISCONTINUED] ibuprofen (MOTRIN) 800 mg tablet Take by mouth (Patient not taking: Reported on 10/14/2022)    [DISCONTINUED] Naproxen Sodium 220 MG CAPS Take by mouth     Allergies   Allergen Reactions    Amoxicillin     Lidocaine Rash    Wound Dressing Adhesive Itching and Rash     Immunization History   Administered Date(s) Administered    COVID-19 PFIZER VACCINE 0.3 ML IM 03/24/2021, 04/17/2021    COVID-19 Pfizer vac (Mark-sucrose, gray cap) 12 yr+ IM 03/11/2022    INFLUENZA 10/25/2019, 10/28/2020       Objective     /86 (BP Location: Left arm, Patient Position: Sitting, Cuff Size: Large)   Pulse 95   Temp 97.5 °F (36.4 °C) (Temporal)   Ht 5' 7\" (1.702 m)   Wt 106 kg (234 lb 3.2 oz)   LMP 02/05/2024 (Exact Date)   SpO2 99%   BMI 36.68 kg/m²     Physical Exam  Vitals reviewed.   Constitutional:       General: She is not in acute distress.     Appearance: Normal appearance. She is obese. She is not ill-appearing, toxic-appearing or diaphoretic.   Cardiovascular:      Rate and Rhythm: Normal rate and regular rhythm.      Pulses: Normal pulses.      Heart sounds: Normal heart sounds. No murmur heard.  Pulmonary:      Effort: Pulmonary effort is normal. No respiratory distress.      Breath sounds: Normal breath sounds.   Abdominal:      General: Abdomen is flat.   Musculoskeletal:         " General: No swelling or deformity.   Skin:     General: Skin is warm and dry.      Capillary Refill: Capillary refill takes less than 2 seconds.      Coloration: Skin is not jaundiced.   Neurological:      General: No focal deficit present.      Mental Status: She is alert.   Psychiatric:         Mood and Affect: Mood normal.         Moose Hutchinson MD

## 2024-03-03 LAB
APOB+LDLR+PCSK9 GENE MUT ANL BLD/T: NOT DETECTED
BRCA1+BRCA2 DEL+DUP + FULL MUT ANL BLD/T: NOT DETECTED
MLH1+MSH2+MSH6+PMS2 GN DEL+DUP+FUL M: NOT DETECTED

## 2024-03-21 ENCOUNTER — APPOINTMENT (OUTPATIENT)
Dept: LAB | Facility: MEDICAL CENTER | Age: 39
End: 2024-03-21

## 2024-03-21 DIAGNOSIS — Z00.8 HEALTH EXAMINATION IN POPULATION SURVEY: ICD-10-CM

## 2024-03-21 LAB
CHOLEST SERPL-MCNC: 194 MG/DL
HDLC SERPL-MCNC: 38 MG/DL
LDLC SERPL CALC-MCNC: 128 MG/DL (ref 0–100)
NONHDLC SERPL-MCNC: 156 MG/DL
TRIGL SERPL-MCNC: 141 MG/DL

## 2024-03-21 PROCEDURE — 36415 COLL VENOUS BLD VENIPUNCTURE: CPT

## 2024-03-21 PROCEDURE — 80061 LIPID PANEL: CPT

## 2024-03-21 PROCEDURE — 83036 HEMOGLOBIN GLYCOSYLATED A1C: CPT

## 2024-03-22 LAB
EST. AVERAGE GLUCOSE BLD GHB EST-MCNC: 105 MG/DL
HBA1C MFR BLD: 5.3 %

## 2024-04-15 ENCOUNTER — OFFICE VISIT (OUTPATIENT)
Dept: FAMILY MEDICINE CLINIC | Facility: CLINIC | Age: 39
End: 2024-04-15
Payer: COMMERCIAL

## 2024-04-15 VITALS
BODY MASS INDEX: 37.29 KG/M2 | SYSTOLIC BLOOD PRESSURE: 134 MMHG | HEART RATE: 84 BPM | DIASTOLIC BLOOD PRESSURE: 72 MMHG | HEIGHT: 67 IN | TEMPERATURE: 98.4 F | OXYGEN SATURATION: 98 % | WEIGHT: 237.6 LBS

## 2024-04-15 DIAGNOSIS — J01.00 ACUTE NON-RECURRENT MAXILLARY SINUSITIS: Primary | ICD-10-CM

## 2024-04-15 PROCEDURE — 99213 OFFICE O/P EST LOW 20 MIN: CPT | Performed by: FAMILY MEDICINE

## 2024-04-15 RX ORDER — AZITHROMYCIN 250 MG/1
TABLET, FILM COATED ORAL DAILY
Qty: 6 TABLET | Refills: 0 | Status: SHIPPED | OUTPATIENT
Start: 2024-04-15 | End: 2024-04-20

## 2024-04-15 NOTE — PROGRESS NOTES
Outpatient Progress Note    Assessment/Plan:    Problem List Items Addressed This Visit    None  Visit Diagnoses       Acute non-recurrent maxillary sinusitis    -  Primary    Relevant Medications    azithromycin (Zithromax) 250 mg tablet           Patient had URI symptoms and sore throat last week, symptoms worsen over the weekend, strep throat negative  Will treat based on acute maxillary sinusitis, azithromycin 500 mg day 1, 250 mg from day 2 through 5      Disposition:     I have spent a total time of 15 minutes on the day of the encounter for this patient including       Encounter provider: Moose Hutchinson MD     Provider located at: [facility]  [address]  [facility] [facility] [address] [phone]     Recent Visits  No visits were found meeting these conditions.  Showing recent visits within past 7 days and meeting all other requirements  Today's Visits  Date Type Provider Dept   04/15/24 Office Visit Moose Hutchinson MD [facility]   Showing today's visits and meeting all other requirements  Future Appointments  No visits were found meeting these conditions.  Showing future appointments within next 150 days and meeting all other requirements     Subjective:   Sally Kate is a 38 y.o. female who is concerned about COVID-19. Patient's symptoms include fatigue, malaise, rhinorrhea, sore throat, cough and shortness of breath. Patient denies fever, chills, congestion, anosmia, loss of taste, chest tightness, abdominal pain, nausea, vomiting, diarrhea, myalgias and headaches.     - Date of symptom onset: 4/13/2024      COVID-19 vaccination status: Fully vaccinated with booster    Exposure:   Contact with a person who is under investigation (PUI) for or who is positive for COVID-19 within the last 14 days?: No    Hospitalized recently for fever and/or lower respiratory symptoms?: No      Currently a healthcare worker that is involved in direct patient care?:  "No      Works in a special setting where the risk of COVID-19 transmission may be high? (this may include long-term care, correctional and skilled nursing facilities; homeless shelters; assisted-living facilities and group homes.): No      Resident in a special setting where the risk of COVID-19 transmission may be high? 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 Negative for chills and fever.   HENT:  Positive for postnasal drip, rhinorrhea and sore throat. Negative for congestion and ear pain.    Respiratory:  Positive for cough and shortness of breath. Negative for chest tightness.    Cardiovascular:  Negative for chest pain.   Gastrointestinal:  Negative for abdominal pain, diarrhea, nausea and vomiting.   Musculoskeletal:  Negative for myalgias.   Skin:  Negative for rash.   Neurological:  Negative for headaches.     Current Outpatient Medications on File Prior to Visit   Medication Sig    cyclobenzaprine (FLEXERIL) 10 mg tablet Take 10 mg by mouth daily as needed    ergocalciferol (VITAMIN D2) 50,000 units TAKE 1 CAPSULE BY MOUTH ONE TIME PER WEEK    levocetirizine (XYZAL) 5 MG tablet Take 5 mg by mouth every evening    medroxyPROGESTERone (PROVERA) 10 mg tablet Take one tablet orally for ten days if no menses for 90 days or greater. Expect a withdrawal bleed after completing medication course    meloxicam (Mobic) 15 mg tablet Take 15 mg by mouth daily    SUMAtriptan (IMITREX) 100 mg tablet Take 1 tablet (100 mg total) by mouth once as needed for migraine for up to 1 dose may repeat in 2 hours if necessary. Max dose 200mg in 24 hour period.    methylPREDNISolone 4 MG " "tablet therapy pack Use as directed on package    sulfaSALAzine (AZULFIDINE-EN) 500 mg EC tablet Take 1,000 mg by mouth 2 (two) times a day (Patient not taking: Reported on 10/26/2023)       Objective:    /72 (BP Location: Left arm, Patient Position: Sitting, Cuff Size: Large)   Pulse 84   Temp 98.4 °F (36.9 °C) (Temporal)   Ht 5' 7\" (1.702 m)   Wt 108 kg (237 lb 9.6 oz)   LMP 02/05/2024 (Approximate)   SpO2 98%   BMI 37.21 kg/m²      Physical Exam  Vitals reviewed.   Constitutional:       Appearance: Normal appearance.   HENT:      Head:      Comments: Maxillary sinus tenderness     Right Ear: Tympanic membrane, ear canal and external ear normal. There is no impacted cerumen.      Left Ear: Tympanic membrane, ear canal and external ear normal. There is no impacted cerumen.      Nose: Congestion present.      Mouth/Throat:      Mouth: Mucous membranes are moist.   Eyes:      Pupils: Pupils are equal, round, and reactive to light.   Cardiovascular:      Rate and Rhythm: Normal rate and regular rhythm.      Pulses: Normal pulses.      Heart sounds: Normal heart sounds. No murmur heard.  Pulmonary:      Effort: Pulmonary effort is normal. No respiratory distress.      Breath sounds: Normal breath sounds. No stridor. No wheezing or rhonchi.   Abdominal:      General: Abdomen is flat.   Musculoskeletal:         General: No swelling or deformity.   Skin:     General: Skin is warm and dry.      Capillary Refill: Capillary refill takes less than 2 seconds.      Coloration: Skin is not jaundiced.   Neurological:      General: No focal deficit present.      Mental Status: She is alert and oriented to person, place, and time.   Psychiatric:         Mood and Affect: Mood normal.           Moose Hutchinson MD    "

## 2024-04-17 ENCOUNTER — HOSPITAL ENCOUNTER (OUTPATIENT)
Dept: RADIOLOGY | Facility: MEDICAL CENTER | Age: 39
Discharge: HOME | End: 2024-04-17
Payer: COMMERCIAL

## 2024-04-17 DIAGNOSIS — N91.2 AMENORRHEA: ICD-10-CM

## 2024-04-17 PROCEDURE — 76830 TRANSVAGINAL US NON-OB: CPT

## 2024-04-17 PROCEDURE — 76856 US EXAM PELVIC COMPLETE: CPT

## 2024-04-25 ENCOUNTER — OFFICE VISIT (OUTPATIENT)
Dept: OBGYN CLINIC | Facility: MEDICAL CENTER | Age: 39
End: 2024-04-25
Payer: COMMERCIAL

## 2024-04-25 VITALS — WEIGHT: 236 LBS | DIASTOLIC BLOOD PRESSURE: 80 MMHG | SYSTOLIC BLOOD PRESSURE: 120 MMHG | BODY MASS INDEX: 36.96 KG/M2

## 2024-04-25 DIAGNOSIS — E28.2 PCOS (POLYCYSTIC OVARIAN SYNDROME): Primary | ICD-10-CM

## 2024-04-25 DIAGNOSIS — R79.89 ELEVATED DHEA: ICD-10-CM

## 2024-04-25 DIAGNOSIS — R79.89 ELEVATED TESTOSTERONE LEVEL: ICD-10-CM

## 2024-04-25 PROCEDURE — 99213 OFFICE O/P EST LOW 20 MIN: CPT | Performed by: PHYSICIAN ASSISTANT

## 2024-04-25 NOTE — PROGRESS NOTES
Assessment/Plan:    1. PCOS (polycystic ovarian syndrome)  - See discussion below. Pt is electing to trial Ovasitol, diet and exercise. She will use Provera on a PRN basis. Advised to take pregnancy test prior to using Provera every 90 days  -recommended repeat DHEA and testosterone levels in 3 months  - call if desires alternative management option    2. Elevated DHEA  - DHEA-sulfate; Future    3. Elevated testosterone level  - Testosterone; Future      Subjective:      Patient ID: Sally Kate is a 38 y.o. female.    Patient presents to the office today for a follow-up visit.  She was evaluated in October for her annual exam at which time she reported a longstanding history of amenorrhea/oligomenorrhea.  At the time of annual it had been almost 1 full year since her last menstrual cycle.  She states she has never had a formal workup for PCOS.  Denies acne/hirsutism.  She does report irregular menses for several years.  She was given a course of Provera to take after her annual exam.  She was not able to pick this up until January.  She did take the Provera in January and had a withdrawal bleed the first week in February.    She completed her lab work.  She did have an elevated serum testosterone at 88.  DHEA levels were also slightly elevated at 351.  She completed a transvaginal ultrasound which showed evidence of polycystic appearing ovaries.  Endometrial stripe was 3 mm.    She is not interested in pursuing infertility workup.  She desires pregnancy but is not actively seeking pregnancy.  She states she has excepted that she likely will not be able to conceive on her own spontaneously and does not wish to pursue an infertility workup.  She would gladly accept pregnancy should it occur but is not actively seeking for this.  We reviewed her AMH levels are elevated at 12.  Given irregular menses, elevated DHEA/testosterone as well as AMH level and PCOS appearing ovaries patient does meet criteria for PCOS  diagnosis.    We then discussed various management options including a trial of metformin to regulate cycles and potentially increase fertility.  We  reviewed management options for treatment of elevated testosterone/DHEA with OCPs but reviewed this would be a contraceptive method and would even further diminish her chances of pregnancy.  We reviewed the option of a Mirena IUD down the line for endometrial protection.  We also reviewed management with supplements such as Ovisitol and every 90-day Provera.  Patient is interested in the latter she would prefer to try to manage her symptoms with diet, exercise and supplements.  She will use Provera on a 90-day basis as needed.        The following portions of the patient's history were reviewed and updated as appropriate: allergies, current medications, past family history, past medical history, past social history, past surgical history, and problem list.    Review of Systems      Objective:      /80 (BP Location: Left arm, Patient Position: Sitting, Cuff Size: Large)   Wt 107 kg (236 lb)   LMP 02/05/2024 (Approximate)   BMI 36.96 kg/m²          Physical Exam  Vitals reviewed.   Constitutional:       Appearance: Normal appearance.   HENT:      Head: Normocephalic and atraumatic.   Pulmonary:      Effort: Pulmonary effort is normal.   Abdominal:      General: Abdomen is flat.   Musculoskeletal:      Right lower leg: No edema.      Left lower leg: No edema.   Skin:     General: Skin is warm and dry.   Neurological:      General: No focal deficit present.      Mental Status: She is alert. Mental status is at baseline.   Psychiatric:         Mood and Affect: Mood normal.         Behavior: Behavior normal.         Thought Content: Thought content normal.         Judgment: Judgment normal.

## 2024-05-07 ENCOUNTER — APPOINTMENT (OUTPATIENT)
Dept: LAB | Facility: MEDICAL CENTER | Age: 39
End: 2024-05-07
Payer: COMMERCIAL

## 2024-05-07 DIAGNOSIS — R79.89 ELEVATED DHEA: ICD-10-CM

## 2024-05-07 DIAGNOSIS — R79.82 ELEVATED C-REACTIVE PROTEIN (CRP): ICD-10-CM

## 2024-05-07 DIAGNOSIS — M46.1 SACROILIITIS, NOT ELSEWHERE CLASSIFIED (HCC): ICD-10-CM

## 2024-05-07 DIAGNOSIS — R79.89 ELEVATED TESTOSTERONE LEVEL: ICD-10-CM

## 2024-05-07 LAB — CRP SERPL QL: 8 MG/L

## 2024-05-07 PROCEDURE — 86140 C-REACTIVE PROTEIN: CPT

## 2024-05-07 PROCEDURE — 36415 COLL VENOUS BLD VENIPUNCTURE: CPT

## 2024-06-07 ENCOUNTER — OFFICE VISIT (OUTPATIENT)
Dept: FAMILY MEDICINE CLINIC | Facility: CLINIC | Age: 39
End: 2024-06-07
Payer: COMMERCIAL

## 2024-06-07 ENCOUNTER — APPOINTMENT (OUTPATIENT)
Dept: LAB | Facility: MEDICAL CENTER | Age: 39
End: 2024-06-07
Payer: COMMERCIAL

## 2024-06-07 VITALS
HEART RATE: 88 BPM | SYSTOLIC BLOOD PRESSURE: 106 MMHG | BODY MASS INDEX: 37.86 KG/M2 | TEMPERATURE: 98.7 F | HEIGHT: 67 IN | WEIGHT: 241.2 LBS | OXYGEN SATURATION: 97 % | DIASTOLIC BLOOD PRESSURE: 70 MMHG

## 2024-06-07 DIAGNOSIS — E28.2 PCOS (POLYCYSTIC OVARIAN SYNDROME): ICD-10-CM

## 2024-06-07 DIAGNOSIS — Z00.00 ANNUAL PHYSICAL EXAM: Primary | ICD-10-CM

## 2024-06-07 LAB
25(OH)D3 SERPL-MCNC: 19.1 NG/ML (ref 30–100)
FERRITIN SERPL-MCNC: 19 NG/ML (ref 11–307)
IRON SATN MFR SERPL: 31 % (ref 15–50)
IRON SERPL-MCNC: 118 UG/DL (ref 50–212)
TIBC SERPL-MCNC: 376 UG/DL (ref 250–450)
UIBC SERPL-MCNC: 258 UG/DL (ref 155–355)

## 2024-06-07 PROCEDURE — 36415 COLL VENOUS BLD VENIPUNCTURE: CPT

## 2024-06-07 PROCEDURE — 83540 ASSAY OF IRON: CPT

## 2024-06-07 PROCEDURE — 83550 IRON BINDING TEST: CPT

## 2024-06-07 PROCEDURE — 82728 ASSAY OF FERRITIN: CPT

## 2024-06-07 PROCEDURE — 99395 PREV VISIT EST AGE 18-39: CPT | Performed by: FAMILY MEDICINE

## 2024-06-07 PROCEDURE — 82306 VITAMIN D 25 HYDROXY: CPT

## 2024-06-07 NOTE — PROGRESS NOTES
Adult Annual Physical  Name: Sally Kate      : 1985      MRN: [id number]  Encounter Provider: Moose Hutchinson MD  Encounter Date: 2024   Encounter department: [facility]    Assessment & Plan   1. Annual physical exam  Immunizations and preventive care screenings were discussed with patient today. Appropriate education was printed on patient's after visit summary.    Counseling:  Alcohol/drug use: discussed moderation in alcohol intake, the recommendations for healthy alcohol use, and avoidance of illicit drug use.  Dental Health: discussed importance of regular tooth brushing, flossing, and dental visits.  Injury prevention: discussed safety/seat belts, safety helmets, smoke detectors, carbon dioxide detectors, and smoking near bedding or upholstery.  Sexual health: discussed sexually transmitted diseases, partner selection, use of condoms, avoidance of unintended pregnancy, and contraceptive alternatives.  Exercise: the importance of regular exercise/physical activity was discussed. Recommend exercise 3-5 times per week for at least 30 minutes.          History of Present Illness     Adult Annual Physical:  Patient presents for annual physical. Annual physical.     Diet and Physical Activity:  - Diet/Nutrition: consuming 3-5 servings of fruits/vegetables daily.  - Exercise: walking. walking after walk    Depression Screening:  - PHQ-2 Score: 0    General Health:  - Sleep:. occasional issue with both falling and staying asleep  - Hearing: normal hearing right ear.  - Vision: no vision problems and wears contacts. color contact  - Dental: brushes teeth twice daily and no dental visits for > 1 year. at least once    /GYN Health:  - Follows with GYN: no.   - Menopause: premenopausal.   - Last menstrual cycle: 2024.   - History of STDs: no  - Contraception:. POCS        Review of Systems   Constitutional:  Negative for chills and fever.   HENT:  Negative for congestion,  "rhinorrhea and sore throat.    Respiratory:  Negative for chest tightness and shortness of breath.    Cardiovascular:  Negative for chest pain.   Gastrointestinal:  Negative for abdominal pain, constipation, diarrhea, nausea and vomiting.   Musculoskeletal:  Positive for arthralgias and back pain.   Neurological:  Negative for dizziness, light-headedness and headaches.        Only 1 migraine episodes since last eval   Psychiatric/Behavioral:  Positive for sleep disturbance.          Objective   /70 (BP Location: Right arm, Patient Position: Sitting, Cuff Size: Large)   Pulse 88   Temp 98.7 °F (37.1 °C) (Temporal)   Ht 5' 7\" (1.702 m)   Wt 109 kg (241 lb 3.2 oz)   LMP 02/01/2024 (Approximate)   SpO2 97%   BMI 37.78 kg/m²     Physical Exam  Vitals reviewed.   Constitutional:       General: She is not in acute distress.     Appearance: Normal appearance. She is obese. She is not ill-appearing, toxic-appearing or diaphoretic.   Cardiovascular:      Rate and Rhythm: Normal rate.      Pulses: Normal pulses.   Pulmonary:      Effort: Pulmonary effort is normal. No respiratory distress.      Breath sounds: Normal breath sounds.   Abdominal:      General: Abdomen is flat. There is no distension.      Palpations: Abdomen is soft.   Musculoskeletal:         General: No swelling or deformity.   Skin:     General: Skin is warm and dry.      Capillary Refill: Capillary refill takes less than 2 seconds.      Coloration: Skin is not jaundiced.   Neurological:      General: No focal deficit present.      Mental Status: She is alert and oriented to person, place, and time.   Psychiatric:         Mood and Affect: Mood normal.           "

## 2024-06-11 DIAGNOSIS — E55.9 VITAMIN D DEFICIENCY: Primary | ICD-10-CM

## 2024-06-11 RX ORDER — ERGOCALCIFEROL 1.25 MG/1
50000 CAPSULE ORAL WEEKLY
Qty: 12 CAPSULE | Refills: 0 | Status: SHIPPED | OUTPATIENT
Start: 2024-06-11

## 2024-06-12 ENCOUNTER — OFFICE VISIT (OUTPATIENT)
Dept: FAMILY MEDICINE CLINIC | Facility: CLINIC | Age: 39
End: 2024-06-12
Payer: COMMERCIAL

## 2024-06-12 VITALS
BODY MASS INDEX: 37.83 KG/M2 | HEART RATE: 96 BPM | WEIGHT: 241 LBS | OXYGEN SATURATION: 96 % | HEIGHT: 67 IN | DIASTOLIC BLOOD PRESSURE: 70 MMHG | SYSTOLIC BLOOD PRESSURE: 128 MMHG | TEMPERATURE: 97.9 F

## 2024-06-12 DIAGNOSIS — J01.00 ACUTE NON-RECURRENT MAXILLARY SINUSITIS: Primary | ICD-10-CM

## 2024-06-12 PROCEDURE — 99213 OFFICE O/P EST LOW 20 MIN: CPT | Performed by: FAMILY MEDICINE

## 2024-06-12 RX ORDER — AZITHROMYCIN 250 MG/1
TABLET, FILM COATED ORAL
Qty: 6 TABLET | Refills: 0 | Status: SHIPPED | OUTPATIENT
Start: 2024-06-12 | End: 2024-06-17

## 2024-06-12 NOTE — PROGRESS NOTES
Outpatient Progress Note  Name: Sally Kate      : 1985      MRN: [id number]  Encounter Provider: Moose Hutchinson MD  Encounter Date: 2024   Encounter department: [facility]    Assessment & Plan   1. Acute non-recurrent maxillary sinusitis  -     azithromycin (Zithromax) 250 mg tablet; Take 2 tablets (500 mg total) by mouth daily for 1 day, THEN 1 tablet (250 mg total) daily for 4 days.    Patient has close contact with individual who has been presenting with upper respiratory symptoms, has been experiencing some worsening sinus pressure, after discussion we will use azithromycin for treatment  Patient may use Mucinex over-the-counter for congestion    Disposition:     I have spent a total time of 15 minutes on the day of the encounter for this patient including          Encounter provider: Moose Hutchinson MD     Provider located at: [address] [facility] [id number] [phone]     Recent Visits  Date Type Provider Dept   24 Office Visit Moose Hutchinson MD [facility]   Showing recent visits within past 7 days and meeting all other requirements  Today's Visits  Date Type Provider Dept   24 Office Visit Moose Hutchinson MD [facility]   Showing today's visits and meeting all other requirements  Future Appointments  No visits were found meeting these conditions.  Showing future appointments within next 150 days and meeting all other requirements    History of Present Illness      Subjective:   Sally Kate is a 38 y.o. female who is concerned about COVID-19. Patient's symptoms include fatigue, malaise, nasal congestion, rhinorrhea, cough, shortness of breath (rare) and headache. Patient denies fever (99), chills, sore throat, anosmia, loss of taste, chest tightness, abdominal pain, nausea, vomiting, diarrhea and myalgias.     - Date of symptom onset: 2024      COVID-19 vaccination  "status: Fully vaccinated with booster    Exposure:   Contact with a person who is under investigation (PUI) for or who is positive for COVID-19 within the last 14 days?: No    Hospitalized recently for fever and/or lower respiratory symptoms?: No      Currently a healthcare worker that is involved in direct patient care?: No      Works in a special setting where the risk of COVID-19 transmission may be high? (this may include long-term care, correctional and alf facilities; homeless shelters; assisted-living facilities and group homes.): No      Resident in a special setting where the risk of COVID-19 transmission may be high? (this may include long-term care, correctional and alf facilities; homeless shelters; assisted-living facilities and group homes.): No       also sick recently  Improving compared to this morning   Started with sore throat  Neck pain    No results found for: \"SARSCOV2\", \"[id number]\", \"SARSCORONAVI\", \"CORONAVIRUSR\", \"SARSCOVAG\", \"SARSCOVAGH\"    Review of Systems   Constitutional:  Positive for fatigue. Negative for chills and fever (99).   HENT:  Positive for congestion and rhinorrhea. Negative for sore throat.    Respiratory:  Positive for cough and shortness of breath (rare). Negative for chest tightness.    Gastrointestinal:  Negative for abdominal pain, diarrhea, nausea and vomiting.   Musculoskeletal:  Negative for myalgias.   Neurological:  Positive for headaches.     Objective     /70 (BP Location: Right arm, Patient Position: Sitting, Cuff Size: Standard)   Pulse 96   Temp 97.9 °F (36.6 °C)   Ht 5' 7\" (1.702 m)   Wt 109 kg (241 lb)   LMP 02/01/2024 (Approximate)   SpO2 96%   BMI 37.75 kg/m²     Physical Exam  Constitutional:       General: She is not in acute distress.     Appearance: Normal appearance. She is not ill-appearing, toxic-appearing or diaphoretic.   HENT:      Head:      Comments: Tenderness over the maxillary sinus      Nose: Nose normal. " No congestion.      Mouth/Throat:      Mouth: Mucous membranes are dry.   Eyes:      Pupils: Pupils are equal, round, and reactive to light.   Cardiovascular:      Rate and Rhythm: Normal rate and regular rhythm.      Pulses: Normal pulses.      Heart sounds: Normal heart sounds. No murmur heard.  Pulmonary:      Effort: Pulmonary effort is normal. No respiratory distress.      Breath sounds: Normal breath sounds.   Abdominal:      General: Abdomen is flat. Bowel sounds are normal. There is no distension.      Palpations: Abdomen is soft.   Musculoskeletal:         General: No swelling or deformity.   Skin:     General: Skin is warm and dry.      Capillary Refill: Capillary refill takes less than 2 seconds.      Coloration: Skin is not jaundiced.   Neurological:      General: No focal deficit present.      Mental Status: She is alert.   Psychiatric:         Mood and Affect: Mood normal.

## 2024-06-20 DIAGNOSIS — J34.89 RHINORRHEA: Primary | ICD-10-CM

## 2024-06-20 RX ORDER — FLUTICASONE PROPIONATE 50 MCG
1 SPRAY, SUSPENSION (ML) NASAL DAILY
Qty: 11.1 ML | Refills: 1 | Status: SHIPPED | OUTPATIENT
Start: 2024-06-20

## 2024-08-20 ENCOUNTER — APPOINTMENT (OUTPATIENT)
Dept: RADIOLOGY | Facility: MEDICAL CENTER | Age: 39
End: 2024-08-20
Payer: COMMERCIAL

## 2024-08-20 ENCOUNTER — OFFICE VISIT (OUTPATIENT)
Dept: FAMILY MEDICINE CLINIC | Facility: CLINIC | Age: 39
End: 2024-08-20
Payer: COMMERCIAL

## 2024-08-20 VITALS
HEART RATE: 72 BPM | WEIGHT: 240.6 LBS | DIASTOLIC BLOOD PRESSURE: 82 MMHG | SYSTOLIC BLOOD PRESSURE: 124 MMHG | OXYGEN SATURATION: 97 % | TEMPERATURE: 98.3 F | HEIGHT: 67 IN | BODY MASS INDEX: 37.76 KG/M2

## 2024-08-20 DIAGNOSIS — S93.422A SPRAIN OF DELTOID LIGAMENT OF LEFT ANKLE, INITIAL ENCOUNTER: Primary | ICD-10-CM

## 2024-08-20 DIAGNOSIS — S93.422A SPRAIN OF DELTOID LIGAMENT OF LEFT ANKLE, INITIAL ENCOUNTER: ICD-10-CM

## 2024-08-20 PROCEDURE — 99214 OFFICE O/P EST MOD 30 MIN: CPT | Performed by: FAMILY MEDICINE

## 2024-08-20 PROCEDURE — 73600 X-RAY EXAM OF ANKLE: CPT

## 2024-08-20 NOTE — PROGRESS NOTES
Ambulatory Visit  Name: Sally Kate      : 1985      MRN: [id number]  Encounter Provider: Moose Hutchinson MD  Encounter Date: 2024   Encounter department: [facility]    Assessment & Plan   1. Sprain of deltoid ligament of left ankle, initial encounter  -     XR ankle 2 vw left; Future; Expected date: 2024    Continue conservative management, rest, elevation, compression icing, patient may obtain ankle sleeve at local pharmacy to wear especially during the, apply ice over cough up to 15 minutes at a time after work may help with swelling and pain  Mobic once a day  As there is some tenderness over the medial malleolus, will obtain a x-ray to rule out any occult fractures  Anticipate significant improvement over the course of next 2 weeks and in 4 weeks         History of Present Illness     HPI    Sally is a 38-year-old female patient presents for evaluation guarding ankle sprain.  Patient reports this occurred last Friday when patient was leaving for work and twisted her ankle on uneven sidewalk.  Denies any significant swelling bleeding afterwards she was able to bear weight with some pain.  She has noticed that there is significant bruising on the lateral aspect of her ankle and foot.  Does some superficial skin abrasion on the medial malleolus.  Without putting weight patient reports about a 2 out of 10 pain, there is a significant worsening pain with weightbearing on the left foot.  He does have some mild numbness and tingling over the toes and dorsum aspect of her feet.  Patient does take Mobic chronically.  Patient denies any significant injury in the past.     Review of Systems   Constitutional:  Negative for chills and fever.   HENT:  Negative for congestion, rhinorrhea and sore throat.    Respiratory:  Negative for chest tightness and shortness of breath.    Cardiovascular:  Negative for chest pain.   Gastrointestinal:  Negative for abdominal pain.    Musculoskeletal:  Positive for arthralgias and gait problem.   Neurological:  Negative for dizziness and headaches.   Psychiatric/Behavioral:  Negative for sleep disturbance.        Past Medical History:   Diagnosis Date    Abnormal Pap smear of cervix     Allergic     Seasonal    Anemia     Mild normocytic anemia    Asthma 2018    Headache(784.0) 2011    Migraines    HPV (human papilloma virus) infection     Migraine     PCOS (polycystic ovarian syndrome)     Varicella      Past Surgical History:   Procedure Laterality Date    COLPOSCOPY  02/13/2018    MARIPOSA 2-3    WISDOM TOOTH EXTRACTION  07/2004     Family History   Problem Relation Age of Onset    Cancer Mother         ?pancreatic    Anxiety disorder Mother     Depression Mother     Arthritis Mother     Sleep disorder Mother     Bipolar disorder Mother     Asthma Mother     Pancreatic cancer Mother     Diabetes Father     Other Father         bladder cancer    Cancer Father         Urinary bladder cancer    Stroke Father     Anxiety disorder Sister     Depression Sister     Asthma Sister     Migraines Sister     Anxiety disorder Brother     Depression Brother     Colon cancer Maternal Grandmother     Thyroid disease Maternal Grandmother     Other Paternal Grandmother         breast disorder    Diabetes Paternal Grandmother     Stomach cancer Paternal Grandmother     Thyroid disease Paternal Grandmother     Ovarian cancer Paternal Grandmother     Stomach cancer Paternal Grandfather     Diabetes Paternal Grandfather     Cancer Paternal Grandfather         Stomach cancer    Other Family         breast disorder    Stomach cancer Family     Thyroid disease Family     Ovarian cancer Family     Breast cancer Maternal Aunt      Social History     Tobacco Use    Smoking status: Never     Passive exposure: Past    Smokeless tobacco: Never   Vaping Use    Vaping status: Never Used   Substance and Sexual Activity    Alcohol use: Yes     Alcohol/week: 2.0 standard drinks of  alcohol     Types: 2 Standard drinks or equivalent per week     Comment: Occasionally    Drug use: Never    Sexual activity: Yes     Partners: Male     Birth control/protection: None     Comment: Pill     Current Outpatient Medications on File Prior to Visit   Medication Sig    Berberine Chloride 500 MG CAPS 1 capsule 3 (three) times a day    cyclobenzaprine (FLEXERIL) 10 mg tablet Take 10 mg by mouth daily as needed    ergocalciferol (VITAMIN D2) 50,000 units Take 1 capsule (50,000 Units total) by mouth once a week    fluticasone (FLONASE) 50 mcg/act nasal spray 1 spray into each nostril daily    levocetirizine (XYZAL) 5 MG tablet Take 5 mg by mouth every evening    medroxyPROGESTERone (PROVERA) 10 mg tablet Take one tablet orally for ten days if no menses for 90 days or greater. Expect a withdrawal bleed after completing medication course    meloxicam (Mobic) 15 mg tablet Take 15 mg by mouth daily    SUMAtriptan (IMITREX) 100 mg tablet Take 1 tablet (100 mg total) by mouth once as needed for migraine for up to 1 dose may repeat in 2 hours if necessary. Max dose 200mg in 24 hour period.    methylPREDNISolone 4 MG tablet therapy pack Use as directed on package    sulfaSALAzine (AZULFIDINE-EN) 500 mg EC tablet Take 1,000 mg by mouth 2 (two) times a day     Allergies   Allergen Reactions    Amoxicillin Other (See Comments)     Pt doesn't remember reaction     Lidocaine Rash and Swelling    Prednisone Itching    Wound Dressing Adhesive Itching and Rash     Immunization History   Administered Date(s) Administered    COVID-19 PFIZER VACCINE 0.3 ML IM 03/24/2021, 04/17/2021, 03/11/2022    COVID-19 Pfizer vac (Mark-sucrose, gray cap) 12 yr+ IM 03/11/2022    DTP 03/10/1987, 06/09/1987, 10/06/1987, 04/12/1988    HiB 12/13/1988    INFLUENZA 10/25/2019, 10/28/2020, 12/26/2023    MMR 04/12/1988    OPV 03/10/1987, 06/09/1987, 04/12/1988     Objective     /82 (BP Location: Left arm, Patient Position: Sitting, Cuff Size:  "Large)   Pulse 72   Temp 98.3 °F (36.8 °C) (Temporal)   Ht 5' 7\" (1.702 m)   Wt 109 kg (240 lb 9.6 oz)   LMP 06/21/2024   SpO2 97%   Breastfeeding No   BMI 37.68 kg/m²     Physical Exam  Vitals reviewed.   Constitutional:       General: She is not in acute distress.     Appearance: Normal appearance. She is obese. She is not ill-appearing, toxic-appearing or diaphoretic.   Cardiovascular:      Rate and Rhythm: Normal rate and regular rhythm.      Pulses: Normal pulses.      Heart sounds: Normal heart sounds. No murmur heard.  Pulmonary:      Effort: Pulmonary effort is normal. No respiratory distress.      Breath sounds: Normal breath sounds. No stridor. No wheezing or rhonchi.   Abdominal:      General: Abdomen is flat. Bowel sounds are normal. There is no distension.      Palpations: Abdomen is soft.      Tenderness: There is no abdominal tenderness.   Musculoskeletal:         General: Tenderness present. No swelling, deformity or signs of injury.      Right lower leg: No edema.      Left lower leg: No edema.      Comments: Superficial skin abrasion on the lateral malleolus, some tenderness surrounding the abrasion site.  Patient does not have any tenderness over base of the fifth metatarsal,   Neurological:      Mental Status: She is alert.           Moose Hutchinson M.D.  Family Medicine    Please excuse any \"sound-alike\" errors that may have ocurred during the process of dictation. Parts of this note have been dictated and there may be errors present in the transcription process. Thank you.    "

## 2024-09-03 DIAGNOSIS — S93.422A SPRAIN OF DELTOID LIGAMENT OF LEFT ANKLE, INITIAL ENCOUNTER: Primary | ICD-10-CM

## 2024-09-04 ENCOUNTER — OFFICE VISIT (OUTPATIENT)
Dept: OBGYN CLINIC | Facility: CLINIC | Age: 39
End: 2024-09-04
Payer: COMMERCIAL

## 2024-09-04 ENCOUNTER — HOSPITAL ENCOUNTER (OUTPATIENT)
Dept: RADIOLOGY | Facility: HOSPITAL | Age: 39
Discharge: HOME | End: 2024-09-04
Payer: COMMERCIAL

## 2024-09-04 VITALS — OXYGEN SATURATION: 98 % | BODY MASS INDEX: 37.98 KG/M2 | HEART RATE: 107 BPM | HEIGHT: 67 IN | WEIGHT: 242 LBS

## 2024-09-04 DIAGNOSIS — M25.572 PAIN, JOINT, ANKLE AND FOOT, LEFT: ICD-10-CM

## 2024-09-04 DIAGNOSIS — S93.422A SPRAIN OF DELTOID LIGAMENT OF LEFT ANKLE, INITIAL ENCOUNTER: ICD-10-CM

## 2024-09-04 DIAGNOSIS — S92.255A CLOSED NONDISPLACED FRACTURE OF NAVICULAR BONE OF LEFT FOOT, INITIAL ENCOUNTER: ICD-10-CM

## 2024-09-04 DIAGNOSIS — M25.572 PAIN, JOINT, ANKLE AND FOOT, LEFT: Primary | ICD-10-CM

## 2024-09-04 PROCEDURE — 73630 X-RAY EXAM OF FOOT: CPT

## 2024-09-04 PROCEDURE — 73610 X-RAY EXAM OF ANKLE: CPT

## 2024-09-04 PROCEDURE — 99204 OFFICE O/P NEW MOD 45 MIN: CPT | Performed by: PHYSICIAN ASSISTANT

## 2024-09-04 NOTE — PROGRESS NOTES
Assessment & Plan   Diagnoses and all orders for this visit:    Pain, joint, ankle and foot, left    Sprain of deltoid ligament of left ankle, initial encounter    Suspected Closed nondisplaced fracture of navicular bone of left foot    - CT foot  - CAM boot  - Ice as needed  - Follow up with Dr. Dread Meade   Patient ID: Sally Kate is a 38 y.o. female.    Vitals:    09/04/24 1117   Pulse: (!) 107   SpO2: 98%     39yo female comes in for an evaluation of her left foot and ankle.  She was injured on 8/16/24 when she stepped on an area of her front walk where the sidewalk was misisng.  She reports her ankle dorsiflexed, everted, and then inverted.  She had sharp, severe pain in the medial ankle.  Ankle xrays in the ED were negative.  Since then, she has continued medial ankle/midfoot pain and significant swelling.  The pain is dull in character, moderate in severity, does not radiate and is not associated with numbness.  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          The following portions of the patient's history were reviewed and updated as appropriate: allergies, current medications, past family history, past medical history, past social history, past surgical history, and problem list.    Review of Systems  Ortho Exam  Past Medical History:   Diagnosis Date    Abnormal Pap smear of cervix     Allergic     Seasonal    Anemia     Mild normocytic anemia    Asthma 2018    Headache(784.0) 2011    Migraines    HPV (human papilloma virus) infection     Migraine     PCOS (polycystic ovarian syndrome)     Varicella      Past Surgical History:   Procedure Laterality Date    COLPOSCOPY  02/13/2018    MARIPOSA 2-3    WISDOM TOOTH EXTRACTION  07/2004     Family History   Problem Relation Age of Onset    Cancer Mother         ?pancreatic    Anxiety disorder Mother     Depression Mother     Arthritis Mother     Sleep disorder Mother     Bipolar disorder Mother     Asthma Mother     Pancreatic cancer  Mother     Diabetes Father     Other Father         bladder cancer    Cancer Father         Urinary bladder cancer    Stroke Father     Anxiety disorder Sister     Depression Sister     Asthma Sister     Migraines Sister     Anxiety disorder Brother     Depression Brother     Colon cancer Maternal Grandmother     Thyroid disease Maternal Grandmother     Other Paternal Grandmother         breast disorder    Diabetes Paternal Grandmother     Stomach cancer Paternal Grandmother     Thyroid disease Paternal Grandmother     Ovarian cancer Paternal Grandmother     Stomach cancer Paternal Grandfather     Diabetes Paternal Grandfather     Cancer Paternal Grandfather         Stomach cancer    Other Family         breast disorder    Stomach cancer Family     Thyroid disease Family     Ovarian cancer Family     Breast cancer Maternal Aunt      Social History     Occupational History    Not on file   Tobacco Use    Smoking status: Never     Passive exposure: Past    Smokeless tobacco: Never   Vaping Use    Vaping status: Never Used   Substance and Sexual Activity    Alcohol use: Yes     Alcohol/week: 2.0 standard drinks of alcohol     Types: 2 Standard drinks or equivalent per week     Comment: Occasionally    Drug use: Never    Sexual activity: Yes     Partners: Male     Birth control/protection: None     Comment: Pill       Review of Systems   Constitutional: Negative.    HENT: Negative.    Eyes: Negative.    Respiratory: Negative.    Cardiovascular: Negative.    Gastrointestinal: Negative.    Endocrine: Negative.    Genitourinary: Negative.    Musculoskeletal: As below..   Allergic/Immunologic: Negative.    Neurological: Negative.    Hematological: Negative.    Psychiatric/Behavioral: Negative.          Objective   Physical Exam      Xray:  I have personally reviewed pertinent films in PACS and my interpretation is concern for nondisplaced fracture of the navicular tuberosity on the ap view of the foot.      Constitutional:  Awake, Alert, Oriented  Eyes: EOMI  Psych: Mood and affect appropriate  Heart: regular rate   Lungs: No audible wheezing  Abdomen: No guarding  Lymph: no lymphedema            left ankle, foot:  Appearance  Swelling: Moderate/large dorsomedial swelling  Palpation  + Deltoid tenderness and + navicular tenderness  Nontender tib/fib, malleoli, lateral ligaments, achilles  ROM  Almost no ROM in any plane  Special Tests  Negative anterior drawer  Motor  Limited by pain  NVI distally

## 2024-09-04 NOTE — LETTER
September 4, 2024     Patient: Sally Kate  YOB: 1985  Date of Visit: 9/4/2024      To Whom it May Concern:    Sally Kate is under my professional care. Sally was seen in my office on 9/4/2024. Sally may return to work.  Allow to wear CAM boot.    If you have any questions or concerns, please don't hesitate to call.         Sincerely,          Spencer Suárez PA-C        CC: No Recipients

## 2024-09-05 ENCOUNTER — HOSPITAL ENCOUNTER (OUTPATIENT)
Dept: RADIOLOGY | Facility: MEDICAL CENTER | Age: 39
Discharge: HOME | End: 2024-09-05
Payer: COMMERCIAL

## 2024-09-05 DIAGNOSIS — M25.572 PAIN, JOINT, ANKLE AND FOOT, LEFT: ICD-10-CM

## 2024-09-05 DIAGNOSIS — S92.255A CLOSED NONDISPLACED FRACTURE OF NAVICULAR BONE OF LEFT FOOT, INITIAL ENCOUNTER: ICD-10-CM

## 2024-09-05 DIAGNOSIS — S93.422A SPRAIN OF DELTOID LIGAMENT OF LEFT ANKLE, INITIAL ENCOUNTER: ICD-10-CM

## 2024-09-05 PROCEDURE — 73700 CT LOWER EXTREMITY W/O DYE: CPT

## 2024-09-11 ENCOUNTER — OFFICE VISIT (OUTPATIENT)
Dept: FAMILY MEDICINE CLINIC | Facility: CLINIC | Age: 39
End: 2024-09-11
Payer: COMMERCIAL

## 2024-09-11 VITALS
DIASTOLIC BLOOD PRESSURE: 68 MMHG | SYSTOLIC BLOOD PRESSURE: 112 MMHG | BODY MASS INDEX: 37.9 KG/M2 | HEIGHT: 67 IN | TEMPERATURE: 97.5 F | HEART RATE: 84 BPM | OXYGEN SATURATION: 97 %

## 2024-09-11 DIAGNOSIS — E55.9 VITAMIN D DEFICIENCY: ICD-10-CM

## 2024-09-11 DIAGNOSIS — J45.20 MILD INTERMITTENT ASTHMA WITHOUT COMPLICATION: ICD-10-CM

## 2024-09-11 DIAGNOSIS — S93.422A SPRAIN OF DELTOID LIGAMENT OF LEFT ANKLE, INITIAL ENCOUNTER: Primary | ICD-10-CM

## 2024-09-11 PROCEDURE — 99213 OFFICE O/P EST LOW 20 MIN: CPT | Performed by: FAMILY MEDICINE

## 2024-09-11 NOTE — PROGRESS NOTES
Ambulatory Visit  Name: Sally Kate      : 1985      MRN: [id number]  Encounter Provider: Moose Hutchinson MD  Encounter Date: 2024   Encounter department: [facility]    Assessment & Plan  Sprain of deltoid ligament of left ankle, initial encounter  Continue using the CAM boot, follow up with podiatry       Mild intermittent asthma without complication  No recent exacerbation        Vitamin D deficiency  Finish vit D supplement  Repeat vit d level in 3 month  Orders:    Vitamin D 25 hydroxy; Future         History of Present Illness       HPI    Sally is a 38-year-old female patient presents for follow-up.  Patient continues to have pain involving her left foot after her ankle injury.  CT scan was completed by orthopedic team, no acute fractures noted.  She is currently wearing a cam boot and has follow-up with podiatry coming up.  Reports her ambulatory function is better with a cam boot, has some mild pain on the calf, no significant numbness and tingling lower extremity.  Denies any worsening asthma symptoms.  Patient is finishing of her vitamin D supplement, last dose next week.    Review of Systems   Constitutional:  Negative for chills and fever.   HENT:  Positive for congestion (in the morning). Negative for postnasal drip and sore throat.    Respiratory:  Negative for chest tightness and shortness of breath.    Cardiovascular:  Negative for chest pain.   Gastrointestinal:  Negative for abdominal pain.   Musculoskeletal:  Positive for arthralgias and gait problem.   Neurological:  Negative for dizziness, light-headedness and headaches.   Psychiatric/Behavioral:  Negative for sleep disturbance.        Past Medical History:   Diagnosis Date    Abnormal Pap smear of cervix     Allergic     Seasonal    Anemia     Mild normocytic anemia    Asthma 2018    Headache(784.0) 2011    Migraines    HPV (human papilloma virus) infection     Migraine     PCOS (polycystic ovarian  syndrome)     Sprained ankle 08/2024    Left    Varicella      Past Surgical History:   Procedure Laterality Date    COLPOSCOPY  02/13/2018    MARIPOSA 2-3    WISDOM TOOTH EXTRACTION  07/2004     Family History   Problem Relation Age of Onset    Cancer Mother         ?pancreatic    Anxiety disorder Mother     Depression Mother     Arthritis Mother     Sleep disorder Mother     Bipolar disorder Mother     Asthma Mother     Pancreatic cancer Mother     Diabetes Father     Other Father         bladder cancer    Cancer Father         Urinary bladder cancer    Stroke Father     Anxiety disorder Sister     Depression Sister     Asthma Sister     Migraines Sister     Anxiety disorder Brother     Depression Brother     Colon cancer Maternal Grandmother     Thyroid disease Maternal Grandmother     Other Paternal Grandmother         breast disorder    Diabetes Paternal Grandmother     Stomach cancer Paternal Grandmother     Thyroid disease Paternal Grandmother     Ovarian cancer Paternal Grandmother     Stomach cancer Paternal Grandfather     Diabetes Paternal Grandfather     Cancer Paternal Grandfather         Stomach cancer    Other Family         breast disorder    Stomach cancer Family     Thyroid disease Family     Ovarian cancer Family     Breast cancer Maternal Aunt      Social History     Tobacco Use    Smoking status: Never     Passive exposure: Past    Smokeless tobacco: Never   Vaping Use    Vaping status: Never Used   Substance and Sexual Activity    Alcohol use: Yes     Alcohol/week: 2.0 standard drinks of alcohol     Types: 2 Standard drinks or equivalent per week     Comment: Occasionally    Drug use: Never    Sexual activity: Yes     Partners: Male     Birth control/protection: None     Comment: Pill     Current Outpatient Medications on File Prior to Visit   Medication Sig    Berberine Chloride 500 MG CAPS 1 capsule 3 (three) times a day    cyclobenzaprine (FLEXERIL) 10 mg tablet Take 10 mg by mouth daily as  "needed    ergocalciferol (VITAMIN D2) 50,000 units Take 1 capsule (50,000 Units total) by mouth once a week    fluticasone (FLONASE) 50 mcg/act nasal spray 1 spray into each nostril daily    levocetirizine (XYZAL) 5 MG tablet Take 5 mg by mouth every evening    medroxyPROGESTERone (PROVERA) 10 mg tablet Take one tablet orally for ten days if no menses for 90 days or greater. Expect a withdrawal bleed after completing medication course    meloxicam (Mobic) 15 mg tablet Take 15 mg by mouth daily    SUMAtriptan (IMITREX) 100 mg tablet Take 1 tablet (100 mg total) by mouth once as needed for migraine for up to 1 dose may repeat in 2 hours if necessary. Max dose 200mg in 24 hour period.    methylPREDNISolone 4 MG tablet therapy pack Use as directed on package    sulfaSALAzine (AZULFIDINE-EN) 500 mg EC tablet Take 1,000 mg by mouth 2 (two) times a day     Allergies   Allergen Reactions    Amoxicillin Other (See Comments)     Pt doesn't remember reaction     Lidocaine Rash and Swelling    Prednisone Itching    Wound Dressing Adhesive Itching and Rash     Immunization History   Administered Date(s) Administered    COVID-19 PFIZER VACCINE 0.3 ML IM 03/24/2021, 04/17/2021, 03/11/2022    COVID-19 Pfizer vac (Mark-sucrose, gray cap) 12 yr+ IM 03/11/2022    DTP 03/10/1987, 06/09/1987, 10/06/1987, 04/12/1988    HiB 12/13/1988    INFLUENZA 10/25/2019, 10/28/2020, 12/26/2023    MMR 04/12/1988    OPV 03/10/1987, 06/09/1987, 04/12/1988     Objective     /68 (BP Location: Right arm, Patient Position: Sitting, Cuff Size: Standard)   Pulse 84   Temp 97.5 °F (36.4 °C)   Ht 5' 7\" (1.702 m)   LMP 06/21/2024   SpO2 97%   Breastfeeding No   BMI 37.90 kg/m²     Physical Exam  Vitals reviewed.   Constitutional:       General: She is not in acute distress.     Appearance: Normal appearance. She is not ill-appearing, toxic-appearing or diaphoretic.   Cardiovascular:      Rate and Rhythm: Normal rate and regular rhythm.      Pulses: " "Normal pulses.      Heart sounds: Normal heart sounds. No murmur heard.  Pulmonary:      Effort: Pulmonary effort is normal. No respiratory distress.      Breath sounds: Normal breath sounds.   Abdominal:      General: Abdomen is flat.   Musculoskeletal:         General: No swelling or deformity.      Comments: In a CAM boot   Skin:     General: Skin is warm and dry.      Capillary Refill: Capillary refill takes less than 2 seconds.      Coloration: Skin is not jaundiced.   Neurological:      General: No focal deficit present.      Mental Status: She is alert and oriented to person, place, and time.   Psychiatric:         Mood and Affect: Mood normal.         Moose Hutchinson M.D.  Family Medicine    Please excuse any \"sound-alike\" errors that may have ocurred during the process of dictation. Parts of this note have been dictated and there may be errors present in the transcription process. Thank you.    "

## 2024-09-12 ENCOUNTER — APPOINTMENT (OUTPATIENT)
Dept: LAB | Facility: MEDICAL CENTER | Age: 39
End: 2024-09-12
Payer: COMMERCIAL

## 2024-09-12 LAB — TESTOST SERPL-MSCNC: 84 NG/DL

## 2024-09-13 LAB — DHEA-S SERPL-MCNC: 488 UG/DL (ref 57.3–279.2)

## 2024-09-18 ENCOUNTER — OFFICE VISIT (OUTPATIENT)
Dept: PODIATRY | Facility: CLINIC | Age: 39
End: 2024-09-18
Payer: COMMERCIAL

## 2024-09-18 VITALS
HEART RATE: 86 BPM | DIASTOLIC BLOOD PRESSURE: 97 MMHG | HEIGHT: 67 IN | SYSTOLIC BLOOD PRESSURE: 133 MMHG | OXYGEN SATURATION: 97 % | WEIGHT: 242 LBS | BODY MASS INDEX: 37.98 KG/M2

## 2024-09-18 DIAGNOSIS — S93.422A SPRAIN OF DELTOID LIGAMENT OF LEFT ANKLE, INITIAL ENCOUNTER: ICD-10-CM

## 2024-09-18 DIAGNOSIS — S93.602A FOOT SPRAIN, LEFT, INITIAL ENCOUNTER: Primary | ICD-10-CM

## 2024-09-18 PROBLEM — M51.37 DEGENERATIVE DISC DISEASE AT L5-S1 LEVEL: Status: ACTIVE | Noted: 2019-03-13

## 2024-09-18 PROBLEM — M46.1 SACROILIITIS, NOT ELSEWHERE CLASSIFIED (HCC): Status: ACTIVE | Noted: 2021-07-13

## 2024-09-18 PROBLEM — M51.379 DEGENERATIVE DISC DISEASE AT L5-S1 LEVEL: Status: ACTIVE | Noted: 2019-03-13

## 2024-09-18 PROCEDURE — 99203 OFFICE O/P NEW LOW 30 MIN: CPT | Performed by: PODIATRIST

## 2024-09-18 NOTE — PROGRESS NOTES
Assessment/Plan:     The patient's clinical examination today significant for some very mild residual tenderness palpation along the medial lateral aspects of the ankle and the area of the deltoid ligament as well as the ATFL.  Mild tenderness is also noted palpation of the extensor apparatus to the anterior ankle.  There is a healing abrasion to the medial ankle without any signs of infection.  There is no active erythema nor edema no calor or ecchymosis noted to the left lower extremity.  Pedal pulses are palpable.  Neurovascular status is grossly intact.    The patient's x-rays and CT scan of the left foot were reviewed.  There are no signs of any acute osseous pathology.  No fractures are identified and navicular.  The medial and lateral malleoli at the ankle joint also appear stable.  The official reports were appreciated.    The patient does seem to making good progress based on her clinical presentation today.  I will attempt to transition her out of the cam boot at work and into a lace up AFO which she can wear with a supportive shoe or sneaker.  I did include some simple ankle exercises that she can start at home to improve range of motion and strength as tolerated.     As she is doing well, I will leave follow-up on an as-needed basis.  I did recommend transitioning out of the ankle brace in the next 3 to 4 weeks as tolerated.  She does have a compression sleeve at home that she can go back into as needed.     Diagnoses and all orders for this visit:    Foot sprain, left, initial encounter    Sprain of deltoid ligament of left ankle, initial encounter          Subjective:     Patient ID: Sally Kate is a 38 y.o. female.    The patient presents today for her initial consultation with [facility]'s podiatry group for follow-up of left medial foot pain.  There was suspicion of a possible nondisplaced fracture at the navicular.  A CT scan was ordered and she presents today for his review.  In regards to  her foot pain, it is much improved.  She states that her pain now is about a 1 out of 10 on the pain scale.  She currently still utilizes her a cam boot for work purposes but tries to go without it when she is out of work.      PAST MEDICAL HISTORY:  Past Medical History:   Diagnosis Date    Abnormal Pap smear of cervix     Allergic     Seasonal    Anemia     Mild normocytic anemia    Asthma 2018    Headache(784.0) 2011    Migraines    HPV (human papilloma virus) infection     Migraine     PCOS (polycystic ovarian syndrome)     Sprained ankle 08/2024    Left    Varicella        PAST SURGICAL HISTORY:  Past Surgical History:   Procedure Laterality Date    COLPOSCOPY  02/13/2018    MARIPOSA 2-3    WISDOM TOOTH EXTRACTION  07/2004        ALLERGIES:  Amoxicillin, Lidocaine, Prednisone, and Wound dressing adhesive    MEDICATIONS:  Current Outpatient Medications   Medication Sig Dispense Refill    Berberine Chloride 500 MG CAPS 1 capsule 3 (three) times a day      cyclobenzaprine (FLEXERIL) 10 mg tablet Take 10 mg by mouth daily as needed      ergocalciferol (VITAMIN D2) 50,000 units Take 1 capsule (50,000 Units total) by mouth once a week 12 capsule 0    fluticasone (FLONASE) 50 mcg/act nasal spray 1 spray into each nostril daily 11.1 mL 1    levocetirizine (XYZAL) 5 MG tablet Take 5 mg by mouth every evening  0    medroxyPROGESTERone (PROVERA) 10 mg tablet Take one tablet orally for ten days if no menses for 90 days or greater. Expect a withdrawal bleed after completing medication course 10 tablet 3    meloxicam (Mobic) 15 mg tablet Take 15 mg by mouth daily      SUMAtriptan (IMITREX) 100 mg tablet Take 1 tablet (100 mg total) by mouth once as needed for migraine for up to 1 dose may repeat in 2 hours if necessary. Max dose 200mg in 24 hour period. 10 tablet 1     No current facility-administered medications for this visit.       SOCIAL HISTORY:  Social History     Socioeconomic History    Marital status: /Civil Union      Spouse name: None    Number of children: None    Years of education: None    Highest education level: None   Occupational History    None   Tobacco Use    Smoking status: Never     Passive exposure: Past    Smokeless tobacco: Never   Vaping Use    Vaping status: Never Used   Substance and Sexual Activity    Alcohol use: Yes     Alcohol/week: 2.0 standard drinks of alcohol     Types: 2 Standard drinks or equivalent per week     Comment: Occasionally    Drug use: Never    Sexual activity: Yes     Partners: Male     Birth control/protection: None     Comment: Pill   Other Topics Concern    None   Social History Narrative    None     Social Determinants of Health     Financial Resource Strain: Not on file   Food Insecurity: Not on file   Transportation Needs: Not on file   Physical Activity: Not on file   Stress: Not on file   Social Connections: Not on file   Intimate Partner Violence: Not on file   Housing Stability: Not on file        Review of Systems   Constitutional: Negative.    HENT: Negative.     Eyes: Negative.    Respiratory: Negative.     Cardiovascular: Negative.    Endocrine: Negative.    Musculoskeletal: Negative.    Neurological: Negative.    Hematological: Negative.    Psychiatric/Behavioral: Negative.           Objective:     Physical Exam  Constitutional:       Appearance: Normal appearance.   HENT:      Head: Normocephalic and atraumatic.      Nose: Nose normal.   Cardiovascular:      Pulses:           Dorsalis pedis pulses are 2+ on the left side.        Posterior tibial pulses are 2+ on the left side.   Pulmonary:      Effort: Pulmonary effort is normal.   Musculoskeletal:        Feet:    Feet:      Left foot:      Skin integrity: Skin integrity normal.      Comments: The patient's clinical examination today significant for some very mild residual tenderness palpation along the medial lateral aspects of the ankle and the area of the deltoid ligament as well as the ATFL.  Mild tenderness is also  noted palpation of the extensor apparatus to the anterior ankle.  There is a healing abrasion to the medial ankle without any signs of infection.  There is no active erythema nor edema no calor or ecchymosis noted to the left lower extremity.  Pedal pulses are palpable.  Neurovascular status is grossly intact.  Skin:     General: Skin is warm.      Capillary Refill: Capillary refill takes less than 2 seconds.   Neurological:      General: No focal deficit present.      Mental Status: She is alert and oriented to person, place, and time.   Psychiatric:         Mood and Affect: Mood normal.         Behavior: Behavior normal.         Thought Content: Thought content normal.

## 2024-10-28 DIAGNOSIS — N91.2 AMENORRHEA: ICD-10-CM

## 2024-10-29 RX ORDER — MEDROXYPROGESTERONE ACETATE 10 MG
TABLET ORAL
Qty: 10 TABLET | Refills: 1 | Status: SHIPPED | OUTPATIENT
Start: 2024-10-29

## 2024-12-04 ENCOUNTER — PATIENT MESSAGE (OUTPATIENT)
Dept: OBGYN CLINIC | Facility: MEDICAL CENTER | Age: 39
End: 2024-12-04

## 2024-12-04 DIAGNOSIS — E28.2 PCOS (POLYCYSTIC OVARIAN SYNDROME): Primary | ICD-10-CM

## 2024-12-04 DIAGNOSIS — R79.89 ELEVATED DHEA: ICD-10-CM

## 2024-12-04 DIAGNOSIS — R79.89 ELEVATED TESTOSTERONE LEVEL: ICD-10-CM

## 2024-12-05 ENCOUNTER — TELEPHONE (OUTPATIENT)
Dept: FAMILY MEDICINE CLINIC | Facility: CLINIC | Age: 39
End: 2024-12-05

## 2024-12-05 NOTE — TELEPHONE ENCOUNTER
Patient stopped by and is asking if PCP could order cortisol levels checked. GYN ordered other labs relating to PCOS and is asking for that lab and any others PCP may want monitored.

## 2024-12-09 ENCOUNTER — OFFICE VISIT (OUTPATIENT)
Dept: FAMILY MEDICINE CLINIC | Facility: CLINIC | Age: 39
End: 2024-12-09
Payer: COMMERCIAL

## 2024-12-09 VITALS
DIASTOLIC BLOOD PRESSURE: 80 MMHG | HEIGHT: 67 IN | TEMPERATURE: 97.5 F | HEART RATE: 74 BPM | SYSTOLIC BLOOD PRESSURE: 124 MMHG | WEIGHT: 241.2 LBS | OXYGEN SATURATION: 98 % | BODY MASS INDEX: 37.86 KG/M2

## 2024-12-09 DIAGNOSIS — E66.812 CLASS 2 SEVERE OBESITY DUE TO EXCESS CALORIES WITH SERIOUS COMORBIDITY AND BODY MASS INDEX (BMI) OF 37.0 TO 37.9 IN ADULT (HCC): ICD-10-CM

## 2024-12-09 DIAGNOSIS — E28.2 PCOS (POLYCYSTIC OVARIAN SYNDROME): Primary | ICD-10-CM

## 2024-12-09 DIAGNOSIS — E66.01 CLASS 2 SEVERE OBESITY DUE TO EXCESS CALORIES WITH SERIOUS COMORBIDITY AND BODY MASS INDEX (BMI) OF 37.0 TO 37.9 IN ADULT (HCC): ICD-10-CM

## 2024-12-09 PROCEDURE — 99214 OFFICE O/P EST MOD 30 MIN: CPT | Performed by: FAMILY MEDICINE

## 2024-12-09 RX ORDER — SEMAGLUTIDE 0.25 MG/.5ML
INJECTION, SOLUTION SUBCUTANEOUS
Qty: 2 ML | Refills: 0 | Status: SHIPPED | OUTPATIENT
Start: 2024-12-09

## 2024-12-09 NOTE — ASSESSMENT & PLAN NOTE
Orders:    Semaglutide-Weight Management (Wegovy) 0.25 MG/0.5ML; Inject 0.25 mg under the skin weekly

## 2024-12-09 NOTE — PROGRESS NOTES
Name: Sally Kate      : 1985      MRN: [id number]  Encounter Provider: Moose Hutchinson MD  Encounter Date: 2024   Encounter department: [facility]    Assessment & Plan  PCOS (polycystic ovarian syndrome)  Add hba1c to outstanding blood work  BMI 37.0-37.9, adult    Orders:    Semaglutide-Weight Management (Wegovy) 0.25 MG/0.5ML; Inject 0.25 mg under the skin weekly    Class 2 severe obesity due to excess calories with serious comorbidity and body mass index (BMI) of 37.0 to 37.9 in adult (HCC)  Prior Authorization Clinical Questions for Weight Management Pharmacotherapy    1. Does the patient have a contrainidcation to medication prescribed for weight management?: No  2. Does the patient have a diagnosis of obesity, confirmed by a BMI greater than or equal to 30 kg/m^2?: Yes  3. Does the patient have a BMI of greater than or equal to 27 kg/m^2 with at least one weight-related comorbidity/risk factor/complication (e.g. diabetes, dyslipidemia, coronary artery disease)?: Yes  4. Weight-related co-morbidities/risk factors: metabolic syndrome, polycystic ovary syndrome (PCOS)  5. Has the patient been on a weight loss regimen of low-calorie diet, increased physical activity, and lifestyle modifications for a minimum of 6 months?: Yes  6. Has the patient completed a comprehensive weight loss program (ie, Weight Watchers, Noom, Bariatrics, other shiv on phone)? If so, what?: Yes   -- Q6 Further Explanation: has tried monitoring diet without imporvement   7. Does the patient have a history of type 2 diabetes?: No  8. Has the member tried and failed other weight loss medication within the past 12 months?: No  9. Will the member use requested medication in combination with another GLP agonist or weight loss drug?: No  10. Is the medication a controlled substance?: No  Additional comments: Pt has diagnosed PCOS     Baseline weight (in pounds): 241 lbs         Baseline weight: 241   Start  date: 12/9/24    Orders:    Semaglutide-Weight Management (Wegovy) 0.25 MG/0.5ML; Inject 0.25 mg under the skin weekly         History of Present Illness       HPI    Sally is a 39-year-old female patient concern for her PCOS symptoms.  Patient would like to have additional blood work in addition to testosterone DHEA-sulfate to check for her types of PCOS.  Both of these was positive and elevated in February 2024.  Her hemoglobin A1c most recently completed in March shows normal levels.  Patient has been eating better, cutting back on large amount of sugars.  Is trying to exercise more regularly.        Review of Systems   Constitutional:  Negative for chills and fever.   HENT:  Negative for congestion, sinus pain and sore throat.    Respiratory:  Negative for shortness of breath.    Cardiovascular:  Negative for chest pain.   Gastrointestinal:  Negative for abdominal pain.   Musculoskeletal:  Positive for back pain.   Neurological:  Negative for dizziness, light-headedness and headaches.   Psychiatric/Behavioral:  Negative for sleep disturbance.        Past Medical History:   Diagnosis Date    Abnormal Pap smear of cervix     Allergic     Seasonal    Anemia     Mild normocytic anemia    Asthma 2018    Headache(784.0) 2011    Migraines    HPV (human papilloma virus) infection     Migraine     PCOS (polycystic ovarian syndrome)     Sprained ankle 08/2024    Left    Varicella      Past Surgical History:   Procedure Laterality Date    COLPOSCOPY  02/13/2018    MARIPOSA 2-3    WISDOM TOOTH EXTRACTION  07/2004     Family History   Problem Relation Age of Onset    Cancer Mother         ?pancreatic    Anxiety disorder Mother     Depression Mother     Arthritis Mother     Sleep disorder Mother     Bipolar disorder Mother     Asthma Mother     Pancreatic cancer Mother     Diabetes Father     Other Father         bladder cancer    Cancer Father         Urinary bladder cancer    Stroke Father     Anxiety disorder Sister      Depression Sister     Asthma Sister     Migraines Sister     Anxiety disorder Brother     Depression Brother     Colon cancer Maternal Grandmother     Thyroid disease Maternal Grandmother     Other Paternal Grandmother         breast disorder    Diabetes Paternal Grandmother     Stomach cancer Paternal Grandmother     Thyroid disease Paternal Grandmother     Ovarian cancer Paternal Grandmother     Stomach cancer Paternal Grandfather     Diabetes Paternal Grandfather     Cancer Paternal Grandfather         Stomach cancer    Other Family         breast disorder    Stomach cancer Family     Thyroid disease Family     Ovarian cancer Family     Breast cancer Maternal Aunt      Social History     Tobacco Use    Smoking status: Never     Passive exposure: Past    Smokeless tobacco: Never   Vaping Use    Vaping status: Never Used   Substance and Sexual Activity    Alcohol use: Yes     Alcohol/week: 2.0 standard drinks of alcohol     Types: 2 Standard drinks or equivalent per week     Comment: Occasionally    Drug use: Never    Sexual activity: Yes     Partners: Male     Birth control/protection: None     Comment: Pill     Current Outpatient Medications on File Prior to Visit   Medication Sig    cyclobenzaprine (FLEXERIL) 10 mg tablet Take 10 mg by mouth daily as needed    levocetirizine (XYZAL) 5 MG tablet Take 5 mg by mouth every evening    medroxyPROGESTERone (PROVERA) 10 mg tablet TAKE ONE TABLET FOR TEN DAYS IF NO MENSES FOR 90 DAYS OR GREATER. EXPECT A WITHDRAWAL BLEED AFTER COMPLETING MEDICATION COURSE    meloxicam (Mobic) 15 mg tablet Take 15 mg by mouth daily    NON FORMULARY Super Calm    SUMAtriptan (IMITREX) 100 mg tablet Take 1 tablet (100 mg total) by mouth once as needed for migraine for up to 1 dose may repeat in 2 hours if necessary. Max dose 200mg in 24 hour period.    Berberine Chloride 500 MG CAPS 1 capsule 3 (three) times a day    ergocalciferol (VITAMIN D2) 50,000 units Take 1 capsule (50,000 Units  "total) by mouth once a week    fluticasone (FLONASE) 50 mcg/act nasal spray 1 spray into each nostril daily     Allergies   Allergen Reactions    Amoxicillin Other (See Comments)     Pt doesn't remember reaction     Lidocaine Rash and Swelling    Prednisone Itching    Wound Dressing Adhesive Itching and Rash     Immunization History   Administered Date(s) Administered    COVID-19 PFIZER VACCINE 0.3 ML IM 03/24/2021, 04/17/2021, 03/11/2022    COVID-19 Pfizer vac (Mark-sucrose, gray cap) 12 yr+ IM 03/11/2022    DTP 03/10/1987, 06/09/1987, 10/06/1987, 04/12/1988    HiB 12/13/1988    INFLUENZA 10/25/2019, 10/28/2020, 12/26/2023    MMR 04/12/1988    OPV 03/10/1987, 06/09/1987, 04/12/1988     Objective   /80 (BP Location: Right arm, Patient Position: Sitting, Cuff Size: Large)   Pulse 74   Temp 97.5 °F (36.4 °C) (Temporal)   Ht 5' 7\" (1.702 m)   Wt 109 kg (241 lb 3.2 oz)   LMP 09/16/2024   SpO2 98%   BMI 37.78 kg/m²     Physical Exam  Vitals reviewed.   Constitutional:       General: She is not in acute distress.     Appearance: Normal appearance. She is obese. She is not ill-appearing, toxic-appearing or diaphoretic.   Cardiovascular:      Rate and Rhythm: Normal rate and regular rhythm.      Pulses: Normal pulses.   Pulmonary:      Effort: Pulmonary effort is normal.   Musculoskeletal:         General: No swelling.   Skin:     General: Skin is warm and dry.      Capillary Refill: Capillary refill takes less than 2 seconds.      Coloration: Skin is not jaundiced.   Neurological:      General: No focal deficit present.      Mental Status: She is alert.   Psychiatric:         Mood and Affect: Mood normal.             "

## 2024-12-10 ENCOUNTER — TELEPHONE (OUTPATIENT)
Age: 39
End: 2024-12-10

## 2024-12-10 NOTE — TELEPHONE ENCOUNTER
PA for WEGOVY 0.25MG SUBMITTED to [employer]    via      [x][email or web address] ID #       [x]PA sent as URGENT    All office notes, labs and other pertaining documents and studies sent. Clinical questions answered. Awaiting determination from insurance company.     Turnaround time for your insurance to make a decision on your Prior Authorization can take 7-21 business days.

## 2024-12-12 ENCOUNTER — APPOINTMENT (OUTPATIENT)
Dept: LAB | Facility: MEDICAL CENTER | Age: 39
End: 2024-12-12
Payer: COMMERCIAL

## 2024-12-12 DIAGNOSIS — E28.2 PCOS (POLYCYSTIC OVARIAN SYNDROME): ICD-10-CM

## 2024-12-12 DIAGNOSIS — E55.9 VITAMIN D DEFICIENCY: ICD-10-CM

## 2024-12-12 LAB
25(OH)D3 SERPL-MCNC: 23.2 NG/ML (ref 30–100)
EST. AVERAGE GLUCOSE BLD GHB EST-MCNC: 103 MG/DL
HBA1C MFR BLD: 5.2 %

## 2024-12-12 PROCEDURE — 83036 HEMOGLOBIN GLYCOSYLATED A1C: CPT

## 2024-12-12 PROCEDURE — 82306 VITAMIN D 25 HYDROXY: CPT

## 2024-12-12 PROCEDURE — 36415 COLL VENOUS BLD VENIPUNCTURE: CPT

## 2024-12-13 ENCOUNTER — RESULTS FOLLOW-UP (OUTPATIENT)
Dept: FAMILY MEDICINE CLINIC | Facility: CLINIC | Age: 39
End: 2024-12-13

## 2024-12-16 NOTE — TELEPHONE ENCOUNTER
PA for wegovy 0.25mg APPROVED     Date(s) approved December 10, 2024 to December 10, 2025         Patient advised by          [x]MyChart Message  []Phone call   [x]LMOM  []L/M to call office as no active Communication consent on file  []Unable to leave detailed message as VM not approved on Communication consent       Pharmacy advised by    [x]Fax  []Phone call    Approval letter scanned into [employer] No waiting for fax

## 2025-01-06 ENCOUNTER — APPOINTMENT (OUTPATIENT)
Dept: LAB | Facility: MEDICAL CENTER | Age: 40
End: 2025-01-06
Payer: COMMERCIAL

## 2025-01-06 DIAGNOSIS — R79.89 ELEVATED DHEA: ICD-10-CM

## 2025-01-06 DIAGNOSIS — E28.2 PCOS (POLYCYSTIC OVARIAN SYNDROME): ICD-10-CM

## 2025-01-06 DIAGNOSIS — R79.89 ELEVATED TESTOSTERONE LEVEL: ICD-10-CM

## 2025-01-06 LAB — TESTOST SERPL-MSCNC: 109 NG/DL (ref 0–75)

## 2025-01-06 PROCEDURE — 84403 ASSAY OF TOTAL TESTOSTERONE: CPT

## 2025-01-06 PROCEDURE — 36415 COLL VENOUS BLD VENIPUNCTURE: CPT

## 2025-01-06 PROCEDURE — 82627 DEHYDROEPIANDROSTERONE: CPT

## 2025-01-07 LAB — DHEA-S SERPL-MCNC: 624 UG/DL (ref 57.3–279.2)

## 2025-01-08 ENCOUNTER — RESULTS FOLLOW-UP (OUTPATIENT)
Dept: OBGYN CLINIC | Facility: CLINIC | Age: 40
End: 2025-01-08

## 2025-01-08 DIAGNOSIS — R79.89 ELEVATED DHEA: Primary | ICD-10-CM

## 2025-01-08 DIAGNOSIS — R79.89 ELEVATED TESTOSTERONE LEVEL: ICD-10-CM

## 2025-01-08 DIAGNOSIS — E28.2 PCOS (POLYCYSTIC OVARIAN SYNDROME): ICD-10-CM

## 2025-01-10 DIAGNOSIS — E66.812 CLASS 2 SEVERE OBESITY DUE TO EXCESS CALORIES WITH SERIOUS COMORBIDITY AND BODY MASS INDEX (BMI) OF 37.0 TO 37.9 IN ADULT (HCC): ICD-10-CM

## 2025-01-10 DIAGNOSIS — E66.01 CLASS 2 SEVERE OBESITY DUE TO EXCESS CALORIES WITH SERIOUS COMORBIDITY AND BODY MASS INDEX (BMI) OF 37.0 TO 37.9 IN ADULT (HCC): ICD-10-CM

## 2025-01-13 ENCOUNTER — OFFICE VISIT (OUTPATIENT)
Dept: FAMILY MEDICINE CLINIC | Facility: CLINIC | Age: 40
End: 2025-01-13
Payer: COMMERCIAL

## 2025-01-13 VITALS
SYSTOLIC BLOOD PRESSURE: 112 MMHG | OXYGEN SATURATION: 97 % | HEART RATE: 84 BPM | TEMPERATURE: 97.6 F | DIASTOLIC BLOOD PRESSURE: 82 MMHG | WEIGHT: 235 LBS | HEIGHT: 67 IN | BODY MASS INDEX: 36.88 KG/M2

## 2025-01-13 DIAGNOSIS — E66.812 CLASS 2 SEVERE OBESITY DUE TO EXCESS CALORIES WITH SERIOUS COMORBIDITY AND BODY MASS INDEX (BMI) OF 35.0 TO 35.9 IN ADULT (HCC): ICD-10-CM

## 2025-01-13 DIAGNOSIS — E66.01 CLASS 2 SEVERE OBESITY DUE TO EXCESS CALORIES WITH SERIOUS COMORBIDITY AND BODY MASS INDEX (BMI) OF 35.0 TO 35.9 IN ADULT (HCC): ICD-10-CM

## 2025-01-13 PROCEDURE — 99213 OFFICE O/P EST LOW 20 MIN: CPT | Performed by: FAMILY MEDICINE

## 2025-01-13 RX ORDER — SEMAGLUTIDE 0.5 MG/.5ML
INJECTION, SOLUTION SUBCUTANEOUS
Qty: 2 ML | Refills: 0 | Status: SHIPPED | OUTPATIENT
Start: 2025-01-13

## 2025-01-13 NOTE — PROGRESS NOTES
Name: Sally Kate      : 1985      MRN: [id number]  Encounter Provider: Moose Hutchinson MD  Encounter Date: 2025   [facility]    Assessment & Plan  BMI 36.0-36.9,adult    Class 2 severe obesity due to excess calories with serious comorbidity and body mass index (BMI) of 35.0 to 35.9 in adult (HCC)  Prior Authorization Clinical Questions for Weight Management Pharmacotherapy    2. Does the patient have a diagnosis of obesity, confirmed by a BMI greater than or equal to 30 kg/m^2?: Yes  3. Does the patient have a BMI of greater than or equal to 27 kg/m^2 with at least one weight-related comorbidity/risk factor/complication (e.g. diabetes, dyslipidemia, coronary artery disease)?: Yes  7. Does the patient have a history of type 2 diabetes?: No  For renewals: Has the patient had a positive outcome with current weight management medication (i.e., change in body weight of at least 4-5% after 12-16 weeks on maximally tolerated dose)?: Yes     Baseline weight (in pounds): 241 lbs  Current weight (in pounds): 235 lbs  Weight loss percentage: -2.49%                   History of Present Illness       HPI    Sally is a 39-year-old female patient presents for follow-up regarding her weight loss medication.  She has been on Wegovy for the last 4 weeks and reports good result, lost about 6 pounds plus since her last evaluation.  Denies any significant side effect.  Patient did have some mild nausea symptoms right after the first injection however denies any vomiting or hypoglycemic episodes.  Denies any injection site reactions.    Review of Systems   Constitutional:  Negative for chills and fever.   HENT:  Negative for congestion, rhinorrhea and sore throat.    Respiratory:  Negative for chest tightness and shortness of breath.    Cardiovascular:  Negative for chest pain.   Gastrointestinal:  Negative for abdominal pain, constipation, diarrhea, nausea and vomiting.    Neurological:  Negative for dizziness, light-headedness and headaches.   Psychiatric/Behavioral:  Negative for sleep disturbance.        Past Medical History:   Diagnosis Date    Abnormal Pap smear of cervix     Allergic     Seasonal    Anemia     Mild normocytic anemia    Asthma 2018    Headache(784.0) 2011    Migraines    HPV (human papilloma virus) infection     Migraine     PCOS (polycystic ovarian syndrome)     Sprained ankle 08/2024    Left    Varicella      Past Surgical History:   Procedure Laterality Date    COLPOSCOPY  02/13/2018    MARIPOSA 2-3    WISDOM TOOTH EXTRACTION  07/2004     Family History   Problem Relation Age of Onset    Cancer Mother         ?pancreatic    Anxiety disorder Mother     Depression Mother     Arthritis Mother     Sleep disorder Mother     Bipolar disorder Mother     Asthma Mother     Pancreatic cancer Mother     Diabetes Father     Other Father         bladder cancer    Cancer Father         Urinary bladder cancer    Stroke Father     Anxiety disorder Sister     Depression Sister     Asthma Sister     Migraines Sister     Anxiety disorder Brother     Depression Brother     Colon cancer Maternal Grandmother     Thyroid disease Maternal Grandmother     Other Paternal Grandmother         breast disorder    Diabetes Paternal Grandmother     Stomach cancer Paternal Grandmother     Thyroid disease Paternal Grandmother     Ovarian cancer Paternal Grandmother     Stomach cancer Paternal Grandfather     Diabetes Paternal Grandfather     Cancer Paternal Grandfather         Stomach cancer    Other Family         breast disorder    Stomach cancer Family     Thyroid disease Family     Ovarian cancer Family     Breast cancer Maternal Aunt      Social History     Tobacco Use    Smoking status: Never     Passive exposure: Past    Smokeless tobacco: Never   Vaping Use    Vaping status: Never Used   Substance and Sexual Activity    Alcohol use: Yes     Alcohol/week: 2.0 standard drinks of alcohol      "Types: 2 Standard drinks or equivalent per week     Comment: Occasionally    Drug use: Never    Sexual activity: Yes     Partners: Male     Birth control/protection: None     Comment: Pill     Current Outpatient Medications on File Prior to Visit   Medication Sig    cyclobenzaprine (FLEXERIL) 10 mg tablet Take 10 mg by mouth daily as needed    levocetirizine (XYZAL) 5 MG tablet Take 5 mg by mouth every evening    medroxyPROGESTERone (PROVERA) 10 mg tablet TAKE ONE TABLET FOR TEN DAYS IF NO MENSES FOR 90 DAYS OR GREATER. EXPECT A WITHDRAWAL BLEED AFTER COMPLETING MEDICATION COURSE    meloxicam (Mobic) 15 mg tablet Take 15 mg by mouth daily    NON FORMULARY Super Calm    Semaglutide-Weight Management (Wegovy) 0.25 MG/0.5ML Inject 0.25 mg under the skin weekly    SUMAtriptan (IMITREX) 100 mg tablet Take 1 tablet (100 mg total) by mouth once as needed for migraine for up to 1 dose may repeat in 2 hours if necessary. Max dose 200mg in 24 hour period.    Berberine Chloride 500 MG CAPS 1 capsule 3 (three) times a day    ergocalciferol (VITAMIN D2) 50,000 units Take 1 capsule (50,000 Units total) by mouth once a week    fluticasone (FLONASE) 50 mcg/act nasal spray 1 spray into each nostril daily     Allergies   Allergen Reactions    Amoxicillin Other (See Comments)     Pt doesn't remember reaction     Lidocaine Rash and Swelling    Prednisone Itching    Wound Dressing Adhesive Itching and Rash     Immunization History   Administered Date(s) Administered    COVID-19 PFIZER VACCINE 0.3 ML IM 03/24/2021, 04/17/2021, 03/11/2022    COVID-19 Pfizer vac (Mark-sucrose, gray cap) 12 yr+ IM 03/11/2022    DTP 03/10/1987, 06/09/1987, 10/06/1987, 04/12/1988    HiB 12/13/1988    INFLUENZA 10/25/2019, 10/28/2020, 12/26/2023    MMR 04/12/1988    OPV 03/10/1987, 06/09/1987, 04/12/1988     Objective   /82 (BP Location: Right arm, Patient Position: Sitting, Cuff Size: Large)   Pulse 84   Temp 97.6 °F (36.4 °C) (Temporal)   Ht 5' 7\" " "(1.702 m)   Wt 107 kg (235 lb)   LMP 01/06/2025 (Exact Date)   SpO2 97%   BMI 36.81 kg/m²     Physical Exam  Vitals reviewed.   Constitutional:       General: She is not in acute distress.     Appearance: Normal appearance. She is obese. She is not ill-appearing, toxic-appearing or diaphoretic.   Cardiovascular:      Rate and Rhythm: Normal rate and regular rhythm.      Pulses: Normal pulses.      Heart sounds: Normal heart sounds. No murmur heard.  Pulmonary:      Effort: Pulmonary effort is normal. No respiratory distress.      Breath sounds: Normal breath sounds.   Abdominal:      General: Abdomen is flat. Bowel sounds are normal. There is no distension.      Palpations: Abdomen is soft.      Tenderness: There is no abdominal tenderness.   Musculoskeletal:         General: No swelling or deformity.   Skin:     General: Skin is warm and dry.      Capillary Refill: Capillary refill takes less than 2 seconds.      Coloration: Skin is not jaundiced.   Neurological:      General: No focal deficit present.      Mental Status: She is alert and oriented to person, place, and time.   Psychiatric:         Mood and Affect: Mood normal.         Moose Hutchinson M.D.  Family Medicine    Please excuse any \"sound-alike\" errors that may have ocurred during the process of dictation. Parts of this note have been dictated and there may be errors present in the transcription process. Thank you.    "

## 2025-01-14 RX ORDER — SEMAGLUTIDE 0.25 MG/.5ML
0.25 INJECTION, SOLUTION SUBCUTANEOUS WEEKLY
OUTPATIENT
Start: 2025-01-14

## 2025-01-29 ENCOUNTER — ANNUAL EXAM (OUTPATIENT)
Dept: OBGYN CLINIC | Facility: MEDICAL CENTER | Age: 40
End: 2025-01-29
Payer: COMMERCIAL

## 2025-01-29 VITALS
SYSTOLIC BLOOD PRESSURE: 112 MMHG | BODY MASS INDEX: 36.1 KG/M2 | DIASTOLIC BLOOD PRESSURE: 70 MMHG | WEIGHT: 230 LBS | HEIGHT: 67 IN

## 2025-01-29 DIAGNOSIS — E28.2 PCOS (POLYCYSTIC OVARIAN SYNDROME): ICD-10-CM

## 2025-01-29 DIAGNOSIS — Z01.419 ENCOUNTER FOR GYNECOLOGICAL EXAMINATION (GENERAL) (ROUTINE) WITHOUT ABNORMAL FINDINGS: Primary | ICD-10-CM

## 2025-01-29 DIAGNOSIS — Z71.85 HPV VACCINE COUNSELING: ICD-10-CM

## 2025-01-29 DIAGNOSIS — N91.2 AMENORRHEA: ICD-10-CM

## 2025-01-29 PROCEDURE — S0612 ANNUAL GYNECOLOGICAL EXAMINA: HCPCS | Performed by: CLINICAL NURSE SPECIALIST

## 2025-01-29 RX ORDER — MEDROXYPROGESTERONE ACETATE 10 MG
TABLET ORAL
Qty: 10 TABLET | Refills: 4 | Status: SHIPPED | OUTPATIENT
Start: 2025-01-29

## 2025-01-29 NOTE — ASSESSMENT & PLAN NOTE
Pt is considering vaccine series.  We discussed the new changes regarding HPV vaccine (AKA Gardasil) today and reviewed the following points.   Until recently was only given up to age 26, now approved up to age 46. Unfortunately not all insurances have agreed to pay if given after age 26.   Series of 3 injections over the course of 6 months (now, 1 and 6 months)   Primary purpose is the prevention of 9 high risk strains of HPV which are associated with cervical cancer as well as genital warts.  Will protect against future exposure but cannot reverse exposure that has already occurred    Encouraged pt to check insurance for HPV/gardasil coverage and start series if agreeable.

## 2025-01-29 NOTE — PROGRESS NOTES
Name: Sally Kate      : 1985      MRN: [id number]  Encounter Provider: RODNEY Saleh  Encounter Date: 2025   Encounter department: [facility] [place]    :  Assessment & Plan  Encounter for gynecological examination (general) (routine) without abnormal findings  Annual GYN examination completed today.   Health maintenance reviewed/updated as appropriate  Risk prevention and anticipatory guidance provided including:  Age related Calcium and vitamin D intake  Encouraged Breast self exams and to call with changes.  Dietary and lifestyle recommendations based on her age and weight. body mass index is 36.02 kg/m²..    Tobacco and alcohol use, intervention ordered if applicable.   Condom use for prevention of STI's. Declines STI Screening.       PCOS (polycystic ovarian syndrome)  Formally dx with PCOS last yr . Needs provera to induce menses q 3months.  refilled       Amenorrhea    Orders:  •  medroxyPROGESTERone (PROVERA) 10 mg tablet; TAKE ONE TABLET FOR TEN DAYS IF NO MENSES FOR 90 DAYS OR GREATER. EXPECT A WITHDRAWAL BLEED AFTER COMPLETING MEDICATION COURSE    HPV vaccine counseling  Pt is considering vaccine series.  We discussed the new changes regarding HPV vaccine (AKA Gardasil) today and reviewed the following points.   Until recently was only given up to age 26, now approved up to age 46. Unfortunately not all insurances have agreed to pay if given after age 26.   Series of 3 injections over the course of 6 months (now, 1 and 6 months)   Primary purpose is the prevention of 9 high risk strains of HPV which are associated with cervical cancer as well as genital warts.  Will protect against future exposure but cannot reverse exposure that has already occurred    Encouraged pt to check insurance for HPV/gardasil coverage and start series if agreeable.                      History of Present Illness     Sally Kate is a 39 y.o. female.She is  here for Gynecologic Exam (Pap 10/26/23 -/-/Birth control/Std testing/Gardasil /Maternal grandmother colon cancer /Maternal aunt breast cancer )      Hx of PCOS and typically no menses and takes PRN provera which is effective.- does have to take every 3 months  She denies any C/O abnormal vaginal discharge or irritation. Denies Urinary complaints or breast changes    Sexually active: ; Monogamous/single partner  Denies C/O related to intimacy/sexual activity  Contraception: none- has PCOS-not planning to have children. Advised with her PCOS/period pattern should seen infertility if pregnancy is desires.    Is on wegovy for wt loss.  Recently started.-11# wt loss  She reports she feels safe at home.   Lifestyle/exercise: active,  exercises when weather nice. Does laps at work when cold  Calcium/vit D  intake:  Hx of vit D def- last check 12/12/24 and 23.2. rec to take 2000-4000u daily- hasn't started yet.    HEALTH MAINTENANCE SCREENINGS:     Previous pap smears and ASCCP screening guidelines have been reviewed.   Last Pap:  10/26/2023; Next due 2028  History of abnormal pap: Yes, 2018 MARIPOSA II    IMMUNIZATIONS  Gardasil HPV vaccine Was Not completed. Was counseled lastYV and declined - but is considering.     Hereditary Cancer Screening  FH Breast cancer: Mat Aunt  FH Ovarian cancer:PGM   FH Uterine cancer: no  FH Colon ca: MGM    Substance Abuse Screening Completed. See hx and flowsheet.    Review of Systems   Constitutional:  Negative for appetite change, chills, fatigue, fever and unexpected weight change.   HENT: Negative.     Eyes: Negative.    Respiratory:  Negative for chest tightness and shortness of breath.    Cardiovascular:  Negative for chest pain and palpitations.   Gastrointestinal:  Negative for abdominal pain, constipation and vomiting.   Endocrine: Negative for cold intolerance and heat intolerance.   Genitourinary:         As per HPI   Musculoskeletal:  Negative for back pain, joint swelling  "and neck pain.   Skin:  Negative for color change and rash.   Neurological:  Negative for dizziness, weakness and numbness.   Hematological:  Does not bruise/bleed easily.   Psychiatric/Behavioral: Negative.       The following portions of the patient's history were reviewed and updated as appropriate: allergies, current medications, past family history, past medical history, past social history, past surgical history, and problem list.      Objective:   Objective   /70 (BP Location: Left arm, Patient Position: Sitting, Cuff Size: Large)   Ht 5' 7\" (1.702 m)   Wt 104 kg (230 lb)   LMP 01/06/2025 (Exact Date)   BMI 36.02 kg/m²     Physical Exam  Constitutional:       General: She is not in acute distress.     Appearance: Normal appearance.   Genitourinary:      Vulva and rectum normal.      No lesions in the vagina.      Right Labia: No rash or lesions.     Left Labia: No lesions or rash.     No vaginal discharge, erythema, tenderness or bleeding.        Right Adnexa: not tender and no mass present.     Left Adnexa: not tender and no mass present.     No cervical motion tenderness, discharge or friability.      Uterus is not enlarged or tender.      No urethral prolapse present.      Pelvic exam was performed with patient in the lithotomy position.   Breasts:     Breasts are symmetrical.      Right: No inverted nipple, mass, nipple discharge, skin change or tenderness.      Left: No inverted nipple, mass, nipple discharge, skin change or tenderness.   HENT:      Head: Normocephalic and atraumatic.   Cardiovascular:      Rate and Rhythm: Normal rate.      Heart sounds: No murmur heard.  Pulmonary:      Effort: Pulmonary effort is normal.      Breath sounds: Normal breath sounds.   Abdominal:      General: There is no distension.      Palpations: Abdomen is soft.      Tenderness: There is no abdominal tenderness.   Musculoskeletal:         General: Normal range of motion.      Cervical back: Normal range of " motion.   Lymphadenopathy:      Cervical: No cervical adenopathy.   Neurological:      Mental Status: She is alert and oriented to person, place, and time.   Skin:     General: Skin is warm and dry.   Psychiatric:         Mood and Affect: Mood normal.         Behavior: Behavior normal.   Vitals reviewed.

## 2025-02-05 ENCOUNTER — CLINICAL SUPPORT (OUTPATIENT)
Dept: FAMILY MEDICINE CLINIC | Facility: CLINIC | Age: 40
End: 2025-02-05
Payer: COMMERCIAL

## 2025-02-05 DIAGNOSIS — Z23 ENCOUNTER FOR IMMUNIZATION: Primary | ICD-10-CM

## 2025-02-05 PROCEDURE — 90651 9VHPV VACCINE 2/3 DOSE IM: CPT

## 2025-02-05 PROCEDURE — 90471 IMMUNIZATION ADMIN: CPT

## 2025-02-11 RX ORDER — SEMAGLUTIDE 0.5 MG/.5ML
INJECTION, SOLUTION SUBCUTANEOUS
Qty: 2 ML | Refills: 0 | Status: SHIPPED | OUTPATIENT
Start: 2025-02-11

## 2025-03-12 ENCOUNTER — OFFICE VISIT (OUTPATIENT)
Dept: FAMILY MEDICINE CLINIC | Facility: CLINIC | Age: 40
End: 2025-03-12
Payer: COMMERCIAL

## 2025-03-12 VITALS
SYSTOLIC BLOOD PRESSURE: 108 MMHG | WEIGHT: 225 LBS | TEMPERATURE: 97.6 F | BODY MASS INDEX: 35.31 KG/M2 | HEART RATE: 74 BPM | HEIGHT: 67 IN | DIASTOLIC BLOOD PRESSURE: 64 MMHG | OXYGEN SATURATION: 100 %

## 2025-03-12 DIAGNOSIS — E66.812 CLASS 2 OBESITY: ICD-10-CM

## 2025-03-12 DIAGNOSIS — Z23 NEED FOR HPV VACCINE: ICD-10-CM

## 2025-03-12 DIAGNOSIS — G43.009 MIGRAINE WITHOUT AURA AND WITHOUT STATUS MIGRAINOSUS, NOT INTRACTABLE: ICD-10-CM

## 2025-03-12 DIAGNOSIS — J45.20 MILD INTERMITTENT ASTHMA WITHOUT COMPLICATION: ICD-10-CM

## 2025-03-12 PROCEDURE — 99214 OFFICE O/P EST MOD 30 MIN: CPT | Performed by: FAMILY MEDICINE

## 2025-03-12 PROCEDURE — 90651 9VHPV VACCINE 2/3 DOSE IM: CPT

## 2025-03-12 PROCEDURE — 90471 IMMUNIZATION ADMIN: CPT

## 2025-03-12 RX ORDER — SEMAGLUTIDE 1 MG/.5ML
INJECTION, SOLUTION SUBCUTANEOUS
Qty: 2 ML | Refills: 0 | Status: SHIPPED | OUTPATIENT
Start: 2025-03-12

## 2025-03-12 RX ORDER — SEMAGLUTIDE 1.7 MG/.75ML
INJECTION, SOLUTION SUBCUTANEOUS
Qty: 3 ML | Refills: 0 | Status: SHIPPED | OUTPATIENT
Start: 2025-03-12

## 2025-03-12 NOTE — PROGRESS NOTES
Name: Sally Kate      : 1985      MRN: [id number]  Encounter Provider: Moose Hutchinson MD  Encounter Date: 3/12/2025   Encounter department: [facility]    Assessment & Plan  BMI 36.0-36.9,adult  Refilled wegovy 1 mg/week, after 4 weeks patient may increase dosage to 1.7 mg/week if there is no significant side effects   Goal weight of around 200lb.     Orders:    Semaglutide-Weight Management (Wegovy) 1 MG/0.5ML; Inject 1 mg under the skin weekly    Semaglutide-Weight Management (Wegovy) 1.7 MG/0.75ML; Inject 1.7 mg under the skin weekly    Need for HPV vaccine  Vaccine given today        Class 2 obesity  Prior Authorization Clinical Questions for Weight Management Pharmacotherapy    2. Does the patient have a diagnosis of obesity, confirmed by a BMI greater than or equal to 30 kg/m^2?: Yes  3. Does the patient have a BMI of greater than or equal to 27 kg/m^2 with at least one weight-related comorbidity/risk factor/complication (e.g. diabetes, dyslipidemia, coronary artery disease)?: Yes  9. Does the patient have a history of type 2 diabetes?: No  For renewals: Has the patient had a positive outcome with current weight management medication (i.e., change in body weight of at least 4-5% after 12-16 weeks on maximally tolerated dose)?: Yes     Baseline weight (in pounds): 241 lbs  Current weight (in pounds): 225 lbs  Weight loss percentage: -6.64%              Mild intermittent asthma without complication  Stable, mild seasonal allergies       Migraine without aura and without status migrainosus, not intractable  No recent migraine episodes            History of Present Illness       HPI    Sally is a 39-year-old female patient presents for follow-up.  Patient has been using Wegovy 0.5 mg once weekly injection for weight loss.  Lost approximately 5 pounds since her last evaluation in end of January.  Patient denies any significant side effect other than some mild constipation.  Is  willing to increase the dosage of medication to 1 mg weekly.  Patient report her asthma has been well-controlled despite changing weather, no significant migraine flareups.  Patient is also due for her second dose of HPV vaccine.    Review of Systems   Constitutional:  Negative for chills and fever.   HENT:  Negative for congestion, rhinorrhea and sore throat.    Respiratory:  Negative for chest tightness and shortness of breath.    Cardiovascular:  Negative for chest pain.   Gastrointestinal:  Positive for constipation. Negative for abdominal pain, diarrhea, nausea and vomiting.   Musculoskeletal:  Positive for arthralgias (mild left ankle pain) and back pain (occasional back ache).   Neurological:  Negative for dizziness, light-headedness and headaches.   Psychiatric/Behavioral:  Negative for sleep disturbance.      Past Medical History:   Diagnosis Date    Abnormal Pap smear of cervix     Allergic     Seasonal    Anemia     Mild normocytic anemia    Asthma 2018    Headache(784.0) 2011    Migraines    HPV (human papilloma virus) infection     Migraine     PCOS (polycystic ovarian syndrome)     Sprained ankle 08/2024    Left    Varicella      Past Surgical History:   Procedure Laterality Date    COLPOSCOPY  02/13/2018    MARIPOSA 2-3    WISDOM TOOTH EXTRACTION  07/2004     Family History   Problem Relation Age of Onset    Cancer Mother         Pancreatic cancer    Anxiety disorder Mother     Depression Mother     Arthritis Mother     Sleep disorder Mother     Bipolar disorder Mother     Asthma Mother     Pancreatic cancer Mother     Diabetes Father     Cancer Father         Urinary bladder cancer    Stroke Father     Anxiety disorder Sister     Depression Sister     Asthma Sister     Migraines Sister     Anxiety disorder Brother     Depression Brother     Colon cancer Maternal Grandmother     Thyroid disease Maternal Grandmother     Diabetes Paternal Grandmother     Stomach cancer Paternal Grandmother     Thyroid  disease Paternal Grandmother     Ovarian cancer Paternal Grandmother     Stomach cancer Paternal Grandfather     Diabetes Paternal Grandfather     Cancer Paternal Grandfather         Stomach cancer    Stomach cancer Family     Thyroid disease Family     Ovarian cancer Family     Breast cancer Maternal Aunt      Social History     Tobacco Use    Smoking status: Never     Passive exposure: Past    Smokeless tobacco: Never   Vaping Use    Vaping status: Never Used   Substance and Sexual Activity    Alcohol use: Yes     Alcohol/week: 1.0 standard drink of alcohol     Types: 1 Standard drinks or equivalent per week     Comment: Occasionally    Drug use: Never    Sexual activity: Yes     Partners: Male     Birth control/protection: None     Comment: Pill     Current Outpatient Medications on File Prior to Visit   Medication Sig    cyclobenzaprine (FLEXERIL) 10 mg tablet Take 10 mg by mouth daily as needed    levocetirizine (XYZAL) 5 MG tablet Take 5 mg by mouth every evening    medroxyPROGESTERone (PROVERA) 10 mg tablet TAKE ONE TABLET FOR TEN DAYS IF NO MENSES FOR 90 DAYS OR GREATER. EXPECT A WITHDRAWAL BLEED AFTER COMPLETING MEDICATION COURSE    meloxicam (Mobic) 15 mg tablet Take 15 mg by mouth daily    NON FORMULARY Super Calm    Semaglutide-Weight Management (Wegovy) 0.5 MG/0.5ML Inject 0.5 mg under the skin weekly    SUMAtriptan (IMITREX) 100 mg tablet Take 1 tablet (100 mg total) by mouth once as needed for migraine for up to 1 dose may repeat in 2 hours if necessary. Max dose 200mg in 24 hour period.     Allergies   Allergen Reactions    Amoxicillin Other (See Comments)     Pt doesn't remember reaction     Lidocaine Rash and Swelling    Prednisone Itching    Wound Dressing Adhesive Itching and Rash     Immunization History   Administered Date(s) Administered    COVID-19 PFIZER VACCINE 0.3 ML IM 03/24/2021, 04/17/2021, 03/11/2022    COVID-19 Pfizer vac (Mark-sucrose, gray cap) 12 yr+ IM 03/11/2022    DTP  "03/10/1987, 06/09/1987, 10/06/1987, 04/12/1988    HPV9 02/05/2025    HiB 12/13/1988    INFLUENZA 10/25/2019, 10/28/2020, 12/26/2023, 10/18/2024    MMR 04/12/1988    OPV 03/10/1987, 06/09/1987, 04/12/1988     Objective   /64 (BP Location: Right arm, Patient Position: Sitting, Cuff Size: Standard)   Pulse 74   Temp 97.6 °F (36.4 °C)   Ht 5' 7\" (1.702 m)   Wt 102 kg (225 lb)   SpO2 100%   Breastfeeding No   BMI 35.24 kg/m²     Physical Exam  Vitals reviewed.   Constitutional:       General: She is not in acute distress.     Appearance: Normal appearance. She is obese. She is not ill-appearing, toxic-appearing or diaphoretic.   Eyes:      Comments: Wears colored contacts    Cardiovascular:      Rate and Rhythm: Normal rate.      Pulses: Normal pulses.   Pulmonary:      Effort: Pulmonary effort is normal.   Abdominal:      General: Abdomen is flat.   Musculoskeletal:         General: No swelling or deformity.   Skin:     General: Skin is warm and dry.      Capillary Refill: Capillary refill takes less than 2 seconds.      Coloration: Skin is not jaundiced.   Neurological:      General: No focal deficit present.      Mental Status: She is alert.   Psychiatric:         Mood and Affect: Mood normal.           Moose Hutchinson M.D.  Family Medicine    Please excuse any \"sound-alike\" errors that may have ocurred during the process of dictation. Parts of this note have been dictated and there may be errors present in the transcription process. Thank you.    "

## 2025-03-12 NOTE — ASSESSMENT & PLAN NOTE
Refilled wegovy 1 mg/week, after 4 weeks patient may increase dosage to 1.7 mg/week if there is no significant side effects   Goal weight of around 200lb.     Orders:    Semaglutide-Weight Management (Wegovy) 1 MG/0.5ML; Inject 1 mg under the skin weekly    Semaglutide-Weight Management (Wegovy) 1.7 MG/0.75ML; Inject 1.7 mg under the skin weekly

## 2025-03-17 ENCOUNTER — APPOINTMENT (OUTPATIENT)
Dept: LAB | Facility: MEDICAL CENTER | Age: 40
End: 2025-03-17

## 2025-03-17 DIAGNOSIS — Z00.8 HEALTH EXAMINATION IN POPULATION SURVEYS: ICD-10-CM

## 2025-03-17 LAB
CHOLEST SERPL-MCNC: 186 MG/DL (ref ?–200)
EST. AVERAGE GLUCOSE BLD GHB EST-MCNC: 105 MG/DL
HBA1C MFR BLD: 5.3 %
HDLC SERPL-MCNC: 35 MG/DL
LDLC SERPL CALC-MCNC: 124 MG/DL (ref 0–100)
NONHDLC SERPL-MCNC: 151 MG/DL
TRIGL SERPL-MCNC: 137 MG/DL (ref ?–150)

## 2025-03-17 PROCEDURE — 80061 LIPID PANEL: CPT

## 2025-03-17 PROCEDURE — 83036 HEMOGLOBIN GLYCOSYLATED A1C: CPT

## 2025-03-17 PROCEDURE — 36415 COLL VENOUS BLD VENIPUNCTURE: CPT

## 2025-03-25 ENCOUNTER — APPOINTMENT (OUTPATIENT)
Dept: LAB | Facility: MEDICAL CENTER | Age: 40
End: 2025-03-25
Payer: COMMERCIAL

## 2025-04-30 ENCOUNTER — OFFICE VISIT (OUTPATIENT)
Dept: ENDOCRINOLOGY | Facility: CLINIC | Age: 40
End: 2025-04-30
Payer: COMMERCIAL

## 2025-04-30 ENCOUNTER — RESULTS FOLLOW-UP (OUTPATIENT)
Dept: ENDOCRINOLOGY | Facility: CLINIC | Age: 40
End: 2025-04-30

## 2025-04-30 ENCOUNTER — APPOINTMENT (OUTPATIENT)
Dept: LAB | Facility: MEDICAL CENTER | Age: 40
End: 2025-04-30
Attending: STUDENT IN AN ORGANIZED HEALTH CARE EDUCATION/TRAINING PROGRAM
Payer: COMMERCIAL

## 2025-04-30 VITALS
SYSTOLIC BLOOD PRESSURE: 114 MMHG | OXYGEN SATURATION: 98 % | DIASTOLIC BLOOD PRESSURE: 78 MMHG | HEIGHT: 67 IN | HEART RATE: 71 BPM | BODY MASS INDEX: 34.06 KG/M2 | WEIGHT: 217 LBS

## 2025-04-30 DIAGNOSIS — D50.8 IRON DEFICIENCY ANEMIA SECONDARY TO INADEQUATE DIETARY IRON INTAKE: ICD-10-CM

## 2025-04-30 DIAGNOSIS — E55.9 VITAMIN D DEFICIENCY: ICD-10-CM

## 2025-04-30 DIAGNOSIS — R79.89 ELEVATED DHEA: ICD-10-CM

## 2025-04-30 DIAGNOSIS — E28.2 PCOS (POLYCYSTIC OVARIAN SYNDROME): ICD-10-CM

## 2025-04-30 DIAGNOSIS — R79.89 ELEVATED DHEA: Primary | ICD-10-CM

## 2025-04-30 DIAGNOSIS — R79.89 ELEVATED TESTOSTERONE LEVEL: ICD-10-CM

## 2025-04-30 LAB
25(OH)D3 SERPL-MCNC: 27.4 NG/ML (ref 30–100)
FERRITIN SERPL-MCNC: 17 NG/ML (ref 30–307)
IRON SATN MFR SERPL: 30 % (ref 15–50)
IRON SERPL-MCNC: 124 UG/DL (ref 50–212)
TESTOST SERPL-MSCNC: 71 NG/DL (ref 0–75)
TIBC SERPL-MCNC: 410.2 UG/DL (ref 250–450)
TRANSFERRIN SERPL-MCNC: 293 MG/DL (ref 203–362)
UIBC SERPL-MCNC: 286 UG/DL (ref 155–355)

## 2025-04-30 PROCEDURE — 86376 MICROSOMAL ANTIBODY EACH: CPT

## 2025-04-30 PROCEDURE — 84403 ASSAY OF TOTAL TESTOSTERONE: CPT

## 2025-04-30 PROCEDURE — 86800 THYROGLOBULIN ANTIBODY: CPT

## 2025-04-30 PROCEDURE — 36415 COLL VENOUS BLD VENIPUNCTURE: CPT

## 2025-04-30 PROCEDURE — 99244 OFF/OP CNSLTJ NEW/EST MOD 40: CPT | Performed by: INTERNAL MEDICINE

## 2025-04-30 PROCEDURE — 82306 VITAMIN D 25 HYDROXY: CPT

## 2025-04-30 PROCEDURE — 83540 ASSAY OF IRON: CPT

## 2025-04-30 PROCEDURE — 82728 ASSAY OF FERRITIN: CPT

## 2025-04-30 PROCEDURE — 83550 IRON BINDING TEST: CPT

## 2025-04-30 PROCEDURE — 82627 DEHYDROEPIANDROSTERONE: CPT

## 2025-04-30 NOTE — PATIENT INSTRUCTIONS
"24 Hour urine collection instructions     Please  a specimen container and a collection device (\"hat\") from your chosen lab.      On day 1, urinate into the toilet when you wake up in the morning and flush. Collect all urine after this into the container for the next 24 hours.   On day 2, urinate into the container in the morning when you wake up. This completes 24h of collection. Cap the container. Label the container with your name, the date, and the time you complete the sample. Return to lab as instructed    During collection and before lab drop-off, keep specimen container in the refrigerator or a cool place during the collection period (cooler with ice, cold garage, mini fridge, etc.)      "

## 2025-04-30 NOTE — ASSESSMENT & PLAN NOTE
As above  Orders:    Ambulatory Referral to Endocrinology    Creatinine, urine, 24 hour    Cortisol, Free, Urine, 24 Hour; Future    DHEA-sulfate; Future    Testosterone; Future    TSH + Free T4; Future    Anti-microsomal antibody; Future    Anti-thyroglobulin antibody; Future

## 2025-04-30 NOTE — PROGRESS NOTES
Name: Sally Kate      : 1985      MRN: [id number]  Encounter Provider: Bertha Barrera MD  Encounter Date: 2025   Encounter department: [facility] [place]    No chief complaint on file.  :  Assessment & Plan  Elevated DHEA  Noted progressive elevation in DHEA-S levels up to 600 with trending testosterone levels.  Patient has had criteria fulfilling PCOS with irregular menstruation and hirsutism/biochemical hyperandrogenism in addition to visualization of increased number of follicles with pelvic imaging, suspect she has had longstanding PCOS.  Other etiology including hyperprolactinemia and NC CAH have been ruled out with normal prolactin & hydroxyprogesterone levels.  Patient currently not interested in pursuing fertility, if this changes she will benefit from referral to [facility]  Can hold off on antiandrogen therapy with Spironolactone given hirsutism is not bothersome for her   Also hold off on metformin given that she does not have clinical signs of insulin resistance and has had a normal A1c  Testosterone elevation and progressive elevation in DHEA levels noted since last year; this may be in the setting of PCOS; will repeat a DHEA-S level along with testosterone now given that she has lost some weight on Wegovy would expect some improvement.  However, with high DHEA-S levels and weight gain before he started on Wegovy, it is reasonable to check for additional etiology of adrenal hyperandrogenism including hypercortisolism which can usually be ACTH dependent.  Ordered 24-hour urine cortisol with creatinine and provided written as well as verbal instructions for this collection.  With DHEA-S levels less than 700, low suspicion of presence of adrenal tumors and patient does not have any signs of virilization, will follow repeat DHEA-S and decide accordingly.  Also check antibodies for Hashimoto's thyroiditis, repeat thyroid function testing with TSH  and free T4.    Follow up in 3 months with another repeat DHEAS level      Orders:    Ambulatory Referral to Endocrinology    Creatinine, urine, 24 hour    Cortisol, Free, Urine, 24 Hour; Future    DHEA-sulfate; Future    Testosterone; Future    TSH + Free T4; Future    Anti-microsomal antibody; Future    Anti-thyroglobulin antibody; Future    DHEA-sulfate; Future    PCOS (polycystic ovarian syndrome)  As above  Orders:    Ambulatory Referral to Endocrinology    Creatinine, urine, 24 hour    Cortisol, Free, Urine, 24 Hour; Future    DHEA-sulfate; Future    Testosterone; Future    TSH + Free T4; Future    Anti-microsomal antibody; Future    Anti-thyroglobulin antibody; Future    Elevated testosterone level  As above   Orders:    Ambulatory Referral to Endocrinology    Creatinine, urine, 24 hour    Cortisol, Free, Urine, 24 Hour; Future    DHEA-sulfate; Future    Testosterone; Future    TSH + Free T4; Future    Anti-microsomal antibody; Future    Anti-thyroglobulin antibody; Future    Vitamin D deficiency  Check vitamin D levels given history of vitamin D deficiency, currently not on supplementation  Orders:    Vitamin D 25 hydroxy; Future    Iron deficiency anemia secondary to inadequate dietary iron intake  Repeat iron panel given that patient has a history of iron deficiency anemia requiring iron transfusions and recent worsening hair loss from her scalp.    Orders:    Iron Panel (Includes Ferritin, Iron Sat%, Iron, and TIBC); Future        History of Present Illness     Sally Kate is a 39 y.o. female with past medical history of PCOS, obesity, migraines presents as a new patient for evaluation of PCOS and elevated DHEAS levels referred by her GYN.     Age at menarche: 15 years  Menstrual cycle: Irregular menstruation for about 4-5 years ever since she went off long term birth control (was on it since 18 years of age). She went 1.5 year without menstruation when she stopped OCPs 4 years ago, was started by  "her GYN on Provera since to get menstruation every 3 months.   Had been on OCPs for very long and hence had a delayed PCOS diagnosis until after she eventually stopped them.    LMP: 04/25/2025  Fertility: Unable to conceive when she went off OCPs in the past for 1 year at least on two separate occasions, did not pursue evaluation at the time.     Hirsutism: Upper lip hair, hair around nipple which she has had since teenage, not bothersome  Reports scalp hair loss  Weight gain: Yes. Before she started Wegovy  Reports easy bruising recently  A1c 5.3, no HTN  Denies voice deepening, changes in libido, clitoromegaly  Not interested in fertility anymore  Has tried Ovasitol once in the past x 3 mo      No FH of PCOS to her knowledge  Mother passed away from pancreatic CA  Father passed away from metastatic bladder CA  Both parents were   Paternal grandfather had gastric cancer and mesothelioma   Maternal grandmother had colon CA  Maternal aunts have had breast and ovarian CA    Pertinent Medical History   As above      Review of Systems as per [facility]       Medical History Reviewed by provider this encounter:  Meds     .    Objective   /78 (BP Location: Left arm, Patient Position: Sitting, Cuff Size: Large)   Pulse 71   Ht 5' 7\" (1.702 m)   Wt 98.4 kg (217 lb)   SpO2 98%   BMI 33.99 kg/m²      Body mass index is 33.99 kg/m².  Wt Readings from Last 3 Encounters:   04/30/25 98.4 kg (217 lb)   03/12/25 102 kg (225 lb)   01/29/25 104 kg (230 lb)     Physical Exam  Constitutional:       General: She is not in acute distress.     Appearance: She is obese. She is not ill-appearing, toxic-appearing or diaphoretic.   HENT:      Head: Normocephalic and atraumatic.      Nose: Nose normal.   Eyes:      Conjunctiva/sclera: Conjunctivae normal.   Neck:      Comments: No clavicular adiposity, mild dorsocervical adiposity noted, no thyromegaly or palpable nodules, no acanthosis, no skin tags or pigmentation "   Cardiovascular:      Rate and Rhythm: Normal rate.   Pulmonary:      Effort: Pulmonary effort is normal.   Abdominal:      Palpations: Abdomen is soft.   Musculoskeletal:         General: No swelling. Normal range of motion.      Cervical back: Neck supple. No tenderness.   Skin:     General: Skin is warm and dry.      Comments: No facial plethora, bruising (except healing bruise on arm), no abdominal striae, no scalp hair thinning visible, no hairline receding, no terminal facial hair present, noted dry hair    Neurological:      Mental Status: She is alert and oriented to person, place, and time. Mental status is at baseline.      Gait: Gait normal.      Comments: No hand tremors   Psychiatric:         Mood and Affect: Mood normal.         Thought Content: Thought content normal.         Labs:   Lab Results   Component Value Date    HGBA1C 5.3 03/17/2025    HGBA1C 5.2 12/12/2024    HGBA1C 5.3 03/21/2024      Latest Reference Range & Units Most Recent 09/14/16 12:14 02/10/24 08:52 03/21/24 09:26 09/12/24 07:31 12/12/24 07:48 01/06/25 07:51 03/17/25 07:46   17-OH PROGESTERONE ng/dL 45  2/10/24 08:52  45        DHEA-SO4 57.3 - 279.2 ug/dL 624.0 (H)  1/6/25 07:51  351.0 (H)  488.0 (H)  624.0 (H)    ANTI-MULLERIAN HORMONE (AMH) ng/mL 12.4  2/10/24 08:52  12.4        FSH, POC See Comment mIU/mL 5.2  2/10/24 08:52  5.2        PROLACTIN 3.34 - 26.72 ng/mL 9.21  2/10/24 08:52  9.21        Hemoglobin A1C Normal 4.0-5.6%; PreDiabetic 5.7-6.4%; Diabetic >=6.5%; Glycemic control for adults with diabetes <7.0% % 5.3  3/17/25 07:46   5.3  5.2  5.3   eAG, EST AVG Glucose mg/dl 105  3/17/25 07:46   105  103  105   ESTRADIOL LEVEL See Comment pg/mL 44.9  2/10/24 08:52  44.9        HCG QUANTITATIVE <=6 mIU/mL <2  9/14/16 12:14 <2         TESTOSTERONE TOTAL 0 - 75 ng/dL ng/dL 109 (H)  1/6/25 07:51  88 (H)  84 (H)  109 (H)    INSULIN, FASTING 1.90 - 23.00 uIU/mL 7.16  2/10/24 08:52  7.16        TSH 3RD GENERATON 0.450 - 4.500 uIU/mL  1.398  2/10/24 08:52 1.330 1.398          GFR, Calculated   Date Value Ref Range Status   12/02/2020 116 >60 mL/min/1.73m2 Final     Comment:     mL/min per 1.73 square meters                                            Normal Function or Mild Renal    Disease (if clinically at risk):  >or=60  Moderately Decreased:                30-59  Severely Decreased:                  15-29  Renal Failure:                         <15                                            -American GFR: multiply reported GFR by 1.16    Please note that the eGFR is based on the CKD-EPI calculation, and is not intended to be used for drug dosing.                                            Note: Calculated GFR may not be an accurate indicator of renal function if the patient's renal function is not in a steady state.    Ordering Provider: STEPHANIE HINTON  Report Copied to : MARTHA CARTER     eGFRcr   Date Value Ref Range Status   10/07/2023 103 >59 Final     eGFR   Date Value Ref Range Status   03/25/2025 91 ml/min/1.73sq m Final     Lab Results   Component Value Date    HDL 35 (L) 03/17/2025    TRIG 137 03/17/2025     Lab Results   Component Value Date    ZDR2DMIPZYWF 1.398 02/10/2024    MSJ7IUIODDWS 1.330 09/14/2016     Study Result    Narrative & Impression   PELVIC ULTRASOUND, COMPLETE     INDICATION: The patient is 38 years old. N91.2: Amenorrhea, unspecified.     COMPARISON: Pelvic ultrasound 1/27/2017.     TECHNIQUE: Transabdominal pelvic ultrasound was performed in sagittal and transverse planes with a curvilinear transducer. Additional transvaginal imaging was performed to better evaluate the endometrium and ovaries. Imaging included volumetric sweeps as   well as traditional still imaging technique.     FINDINGS:     UTERUS:  The uterus is retroverted in position, measuring 6.6 x 3.4 x 3.3 cm.  The uterus has a normal contour and echotexture.  The cervix appears within normal limits.     ENDOMETRIUM:  The endometrial echo  "complex has an AP caliber of 3.0 mm.  Appearance within normal limits.     OVARIES/ADNEXA:  Right ovary: 2.6 x 2.2 x 2.2 cm. 6.6 mL.  Ovarian Doppler flow is within normal limits.  There are numerous ( >=  20) small peripheralized follicles with central stromal hyperechogenicity, findings consistent with polycystic ovarian morphology.        Left ovary: 3.0 x 2.2 x 1.8 cm. 6.4 mL.  Ovarian Doppler flow is within normal limits.  No suspicious ovarian or adnexal abnormality.     OTHER:  No free fluid or loculated fluid collections.     IMPRESSION:     1.  Findings meeting the consensus guidelines for polycystic ovarian morphology of the right ovary.  In the absence of ovulatory dysfunction or clinically/biochemically diagnosed hyperandrogenism, findings are nonspecific and do not indicate the presence   of polycystic ovarian syndrome.  Reference: Recommendations from the international evidence-based guideline for the assessment and management of polycystic ovary syndrome. Hum Reprod. 2018 Sep 1;33(9):3757-4644.  2.  Otherwise normal pelvic ultrasound.           Workstation performed: [id number]        Imaging    US pelvis complete w transvaginal (Order: [id number]) - 4/17/2024  No results found for: \"FREET4\", \"TSI\"    Patient Instructions   24 Hour urine collection instructions     Please  a specimen container and a collection device (\"hat\") from your chosen lab.      On day 1, urinate into the toilet when you wake up in the morning and flush. Collect all urine after this into the container for the next 24 hours.   On day 2, urinate into the container in the morning when you wake up. This completes 24h of collection. Cap the container. Label the container with your name, the date, and the time you complete the sample. Return to lab as instructed    During collection and before lab drop-off, keep specimen container in the refrigerator or a cool place during the collection period (cooler with ice, cold garage, " mini fridge, etc.)        Discussed with the patient and all questioned fully answered. She will call me if any problems arise.

## 2025-05-01 ENCOUNTER — APPOINTMENT (OUTPATIENT)
Dept: LAB | Facility: MEDICAL CENTER | Age: 40
End: 2025-05-01
Attending: STUDENT IN AN ORGANIZED HEALTH CARE EDUCATION/TRAINING PROGRAM
Payer: COMMERCIAL

## 2025-05-01 DIAGNOSIS — R79.89 ELEVATED DHEA: ICD-10-CM

## 2025-05-01 DIAGNOSIS — R79.89 ELEVATED TESTOSTERONE LEVEL: ICD-10-CM

## 2025-05-01 DIAGNOSIS — E28.2 PCOS (POLYCYSTIC OVARIAN SYNDROME): ICD-10-CM

## 2025-05-01 LAB
CREAT 24H UR-MRATE: 1.4 G/24HR (ref 0.6–1.8)
SPECIMEN VOL UR: 1350 ML

## 2025-05-01 PROCEDURE — 82570 ASSAY OF URINE CREATININE: CPT | Performed by: INTERNAL MEDICINE

## 2025-05-01 PROCEDURE — 82530 CORTISOL FREE: CPT

## 2025-05-02 LAB
DHEA-S SERPL-MCNC: 434 UG/DL (ref 57.3–279.2)
THYROGLOB AB SERPL-ACNC: <1 IU/ML (ref 0–0.9)
THYROPEROXIDASE AB SERPL-ACNC: <9 IU/ML (ref 0–34)

## 2025-05-05 ENCOUNTER — OFFICE VISIT (OUTPATIENT)
Dept: FAMILY MEDICINE CLINIC | Facility: CLINIC | Age: 40
End: 2025-05-05
Payer: COMMERCIAL

## 2025-05-05 VITALS
SYSTOLIC BLOOD PRESSURE: 102 MMHG | HEIGHT: 67 IN | DIASTOLIC BLOOD PRESSURE: 80 MMHG | OXYGEN SATURATION: 99 % | HEART RATE: 99 BPM | TEMPERATURE: 97.9 F | WEIGHT: 214.2 LBS | BODY MASS INDEX: 33.62 KG/M2

## 2025-05-05 DIAGNOSIS — R30.0 DYSURIA: Primary | ICD-10-CM

## 2025-05-05 LAB
CORTIS F 24H UR-MRATE: 19 UG/24 HR (ref 6–42)
CORTIS F UR-MCNC: 14 UG/L
SL AMB  POCT GLUCOSE, UA: NORMAL
SL AMB LEUKOCYTE ESTERASE,UA: 125
SL AMB POCT BILIRUBIN,UA: NORMAL
SL AMB POCT BLOOD,UA: 10
SL AMB POCT CLARITY,UA: NORMAL
SL AMB POCT COLOR,UA: NORMAL
SL AMB POCT KETONES,UA: NORMAL
SL AMB POCT NITRITE,UA: NORMAL
SL AMB POCT PH,UA: 5.5
SL AMB POCT SPECIFIC GRAVITY,UA: 1.03
SL AMB POCT URINE PROTEIN: NORMAL
SL AMB POCT UROBILINOGEN: 0.2

## 2025-05-05 PROCEDURE — 87086 URINE CULTURE/COLONY COUNT: CPT | Performed by: PHYSICIAN ASSISTANT

## 2025-05-05 PROCEDURE — 99213 OFFICE O/P EST LOW 20 MIN: CPT | Performed by: PHYSICIAN ASSISTANT

## 2025-05-05 PROCEDURE — 87186 SC STD MICRODIL/AGAR DIL: CPT | Performed by: PHYSICIAN ASSISTANT

## 2025-05-05 PROCEDURE — 81003 URINALYSIS AUTO W/O SCOPE: CPT | Performed by: PHYSICIAN ASSISTANT

## 2025-05-05 PROCEDURE — 87077 CULTURE AEROBIC IDENTIFY: CPT | Performed by: PHYSICIAN ASSISTANT

## 2025-05-05 RX ORDER — NITROFURANTOIN 25; 75 MG/1; MG/1
100 CAPSULE ORAL 2 TIMES DAILY
Qty: 10 CAPSULE | Refills: 0 | Status: SHIPPED | OUTPATIENT
Start: 2025-05-05 | End: 2025-05-10

## 2025-05-05 RX ORDER — SEMAGLUTIDE 1.7 MG/.75ML
INJECTION, SOLUTION SUBCUTANEOUS
Qty: 3 ML | Refills: 0 | OUTPATIENT
Start: 2025-05-05

## 2025-05-05 RX ORDER — SEMAGLUTIDE 2.4 MG/.75ML
INJECTION, SOLUTION SUBCUTANEOUS
Qty: 3 ML | Refills: 2 | Status: SHIPPED | OUTPATIENT
Start: 2025-05-05

## 2025-05-05 NOTE — PROGRESS NOTES
"Name: Sally Kate      : 1985      MRN: [id number]  Encounter Provider: Merry Rose PA-C  Encounter Date: 2025   Encounter department: [facility]  :  Assessment & Plan  Dysuria  Urine dip in the office is abnormal.  Send to the lab for culture.  Start Macrobid  Orders:    POCT urine dip auto non-scope    Urine culture    nitrofurantoin (MACROBID) 100 mg capsule; Take 1 capsule (100 mg total) by mouth 2 (two) times a day for 5 days           History of Present Illness   Patient presents in the office with UTI symptoms that started over the weekend.  States she has a sensation to urinate but nothing really comes out.  No real dysuria.  She does have some frequency no fever.  No gross hematuria.  Abdominal discomfort last night which has resolved.  Urine dip in the office shows leukocytes and microscopic hematuria.      Review of Systems   Constitutional:  Negative for activity change, appetite change and fever.   Respiratory:  Negative for cough and shortness of breath.    Cardiovascular:  Negative for chest pain and leg swelling.   Genitourinary:  Positive for frequency. Negative for difficulty urinating, dysuria, flank pain and hematuria.       Objective   /80 (BP Location: Right arm, Patient Position: Sitting, Cuff Size: Extra-Large)   Pulse 99   Temp 97.9 °F (36.6 °C) (Temporal)   Ht 5' 7\" (1.702 m)   Wt 97.2 kg (214 lb 3.2 oz)   LMP 2025 (Approximate)   SpO2 99%   Breastfeeding No   BMI 33.55 kg/m²      Physical Exam  Vitals and nursing note reviewed.   Constitutional:       Appearance: She is obese.   Cardiovascular:      Rate and Rhythm: Normal rate and regular rhythm.      Heart sounds: Normal heart sounds. No murmur heard.  Pulmonary:      Effort: Pulmonary effort is normal.      Breath sounds: Normal breath sounds.   Abdominal:      General: Bowel sounds are normal. There is no distension.      Palpations: Abdomen is soft.      Tenderness: There is " no abdominal tenderness. There is no right CVA tenderness or left CVA tenderness.   Musculoskeletal:      Right lower leg: No edema.      Left lower leg: No edema.   Skin:     General: Skin is warm and dry.   Neurological:      General: No focal deficit present.      Mental Status: She is oriented to person, place, and time.   Psychiatric:         Mood and Affect: Mood normal.         Behavior: Behavior normal.         Thought Content: Thought content normal.         Judgment: Judgment normal.

## 2025-05-08 ENCOUNTER — RESULTS FOLLOW-UP (OUTPATIENT)
Dept: FAMILY MEDICINE CLINIC | Facility: CLINIC | Age: 40
End: 2025-05-08

## 2025-05-08 LAB
BACTERIA UR CULT: ABNORMAL
BACTERIA UR CULT: ABNORMAL

## 2025-05-09 ENCOUNTER — OFFICE VISIT (OUTPATIENT)
Dept: FAMILY MEDICINE CLINIC | Facility: CLINIC | Age: 40
End: 2025-05-09
Payer: COMMERCIAL

## 2025-05-09 VITALS
OXYGEN SATURATION: 99 % | BODY MASS INDEX: 33.21 KG/M2 | SYSTOLIC BLOOD PRESSURE: 112 MMHG | HEART RATE: 97 BPM | WEIGHT: 211.6 LBS | DIASTOLIC BLOOD PRESSURE: 80 MMHG | TEMPERATURE: 98 F | HEIGHT: 67 IN

## 2025-05-09 DIAGNOSIS — J01.00 ACUTE NON-RECURRENT MAXILLARY SINUSITIS: ICD-10-CM

## 2025-05-09 DIAGNOSIS — J02.9 SORE THROAT: ICD-10-CM

## 2025-05-09 DIAGNOSIS — J06.9 UPPER RESPIRATORY TRACT INFECTION, UNSPECIFIED TYPE: Primary | ICD-10-CM

## 2025-05-09 PROCEDURE — 99213 OFFICE O/P EST LOW 20 MIN: CPT | Performed by: FAMILY MEDICINE

## 2025-05-09 RX ORDER — AZITHROMYCIN 250 MG/1
TABLET, FILM COATED ORAL
Qty: 6 TABLET | Refills: 0 | Status: SHIPPED | OUTPATIENT
Start: 2025-05-09 | End: 2025-05-14

## 2025-05-09 NOTE — LETTER
May 9, 2025     Patient: Sally Kate  YOB: 1985  Date of Visit: 5/9/2025      To Whom it May Concern:    Sally Kate is under my professional care. Sally was seen in my office on 5/9/2025. Sally may return to work on 5/12/25 .    If you have any questions or concerns, please don't hesitate to call.         Sincerely,          Moose Hutchinson MD        CC: No Recipients

## 2025-05-09 NOTE — PROGRESS NOTES
Outpatient Progress Note  Name: Sally Kate      : 1985      MRN: [id number]  Encounter Provider: Moose Hutchinson MD  Encounter Date: 2025   Encounter department: [facility]  :  Assessment & Plan  Upper respiratory tract infection, unspecified type  Patient's symptoms may be secondary to postnasal drip secondary to allergies continue current medication with Claritin and patient may try over-the-counter Mucinex  With no significant improvement in symptoms in approximately 5 days we may start azithromycin for treatment of acute sinusitis       Sore throat         Acute non-recurrent maxillary sinusitis             COVID-19 Plan       Recent Visits  Date Type Provider Dept   25 Office Visit DANYELLE Kennedy Pg   Showing recent visits within past 7 days and meeting all other requirements  Today's Visits  Date Type Provider Dept   25 Office Visit MD Nathan Santacruz   Showing today's visits and meeting all other requirements  Future Appointments  No visits were found meeting these conditions.  Showing future appointments within next 150 days and meeting all other requirements    History of Present Illness     Subjective:   Sally aKte is a 39 y.o. female who is concerned about COVID-19. Patient's symptoms include fatigue, nasal congestion, sore throat and cough. Patient denies fever, chills, malaise, rhinorrhea, anosmia, loss of taste, shortness of breath, chest tightness, abdominal pain, nausea, vomiting, diarrhea, myalgias and headaches.         COVID-19 vaccination status: Fully vaccinated with booster    Exposure:   Contact with a person who is under investigation (PUI) for or who is positive for COVID-19 within the last 14 days?: No    Hospitalized recently for fever and/or lower respiratory symptoms?: No      Currently a healthcare worker that is involved in direct patient care?: No      Works in a special setting where the  "risk of COVID-19 transmission may be high? (this may include long-term care, correctional and senior care facilities; homeless shelters; assisted-living facilities and group homes.): No      Resident in a special setting where the risk of COVID-19 transmission may be high? (this may include long-term care, correctional and senior care facilities; homeless shelters; assisted-living facilities and group homes.): No      Chest heaviness with laying down  Currently on Macrobid       Lab Results   Component Value Date    SARSCOV2 Positive (A) 09/27/2021       Review of Systems   Constitutional:  Positive for fatigue. Negative for chills and fever.   HENT:  Positive for congestion, sore throat and trouble swallowing. Negative for drooling, ear discharge, ear pain and rhinorrhea.    Respiratory:  Positive for cough. Negative for chest tightness, shortness of breath and stridor.    Gastrointestinal:  Negative for abdominal pain, diarrhea, nausea and vomiting.   Musculoskeletal:  Positive for neck pain. Negative for myalgias.   Neurological:  Negative for headaches.     Objective   /80 (BP Location: Left arm, Patient Position: Sitting, Cuff Size: Large)   Pulse 97   Temp 98 °F (36.7 °C) (Temporal)   Ht 5' 7\" (1.702 m)   Wt 96 kg (211 lb 9.6 oz)   LMP 04/25/2025 (Approximate)   SpO2 99%   BMI 33.14 kg/m²     Physical Exam  Vitals reviewed.   Constitutional:       Appearance: Normal appearance.   HENT:      Head:      Comments: Mild maxillary sinus infection   Cardiovascular:      Rate and Rhythm: Normal rate.      Pulses: Normal pulses.   Pulmonary:      Effort: Pulmonary effort is normal.   Abdominal:      General: Abdomen is flat.   Musculoskeletal:         General: No swelling or deformity.   Skin:     General: Skin is warm and dry.      Capillary Refill: Capillary refill takes less than 2 seconds.      Coloration: Skin is not jaundiced.   Neurological:      General: No focal deficit present.      Mental Status: " She is alert and oriented to person, place, and time.   Psychiatric:         Mood and Affect: Mood normal.         Administrative Statements   Encounter provider Moose Hutchinson MD

## 2025-06-11 ENCOUNTER — OFFICE VISIT (OUTPATIENT)
Dept: FAMILY MEDICINE CLINIC | Facility: CLINIC | Age: 40
End: 2025-06-11
Payer: COMMERCIAL

## 2025-06-11 VITALS
HEIGHT: 67 IN | WEIGHT: 207.6 LBS | SYSTOLIC BLOOD PRESSURE: 122 MMHG | DIASTOLIC BLOOD PRESSURE: 70 MMHG | HEART RATE: 74 BPM | OXYGEN SATURATION: 98 % | BODY MASS INDEX: 32.58 KG/M2 | TEMPERATURE: 97.6 F

## 2025-06-11 DIAGNOSIS — J45.20 MILD INTERMITTENT ASTHMA WITHOUT COMPLICATION: ICD-10-CM

## 2025-06-11 DIAGNOSIS — Z00.00 ANNUAL PHYSICAL EXAM: Primary | ICD-10-CM

## 2025-06-11 DIAGNOSIS — G43.009 MIGRAINE WITHOUT AURA AND WITHOUT STATUS MIGRAINOSUS, NOT INTRACTABLE: ICD-10-CM

## 2025-06-11 PROCEDURE — 99214 OFFICE O/P EST MOD 30 MIN: CPT | Performed by: FAMILY MEDICINE

## 2025-06-11 PROCEDURE — 99395 PREV VISIT EST AGE 18-39: CPT | Performed by: FAMILY MEDICINE

## 2025-06-11 RX ORDER — ALBUTEROL SULFATE 90 UG/1
2 INHALANT RESPIRATORY (INHALATION) EVERY 6 HOURS PRN
Qty: 6.7 G | Refills: 5 | Status: SHIPPED | OUTPATIENT
Start: 2025-06-11

## 2025-06-11 NOTE — ASSESSMENT & PLAN NOTE
Refill albuterol inhaler  Orders:    albuterol (Proventil HFA) 90 mcg/act inhaler; Inhale 2 puffs every 6 (six) hours as needed for wheezing

## 2025-06-11 NOTE — PROGRESS NOTES
Adult Annual Physical  Name: Sally Kate      : 1985      MRN: [id number]  Encounter Provider: Moose Hutchinson MD  Encounter Date: 2025   Encounter department: [facility]    :  Assessment & Plan  Annual physical exam  Patient blood work up-to-date, annual physical completed       Migraine without aura and without status migrainosus, not intractable  Migraine symptom well-controlled at this time       Mild intermittent asthma without complication  Refill albuterol inhaler  Orders:    albuterol (Proventil HFA) 90 mcg/act inhaler; Inhale 2 puffs every 6 (six) hours as needed for wheezing    BMI 32.0-32.9,adult  Continue current doses Wegovy 2.5 mg weekly           Preventive Screenings:    - Cervical cancer screening: has history of cervical cancer     Immunizations:  - Immunizations due: Prevnar 20 and Tdap         History of Present Illness     Adult Annual Physical:  Patient presents for annual physical. Sally is a 39-year-old female patient presents for annual physical exam.  Patient is currently on Wegovy 2.5 mg weekly for weight loss, lost approximately 4 pounds since her last evaluation.  Patient denies any significant nausea and vomiting after food.  Has mild constipation which is being managed by over-the-counter supplements.     Diet and Physical Activity:  - Diet/Nutrition: portion control and limited junk food.  - Exercise: walking, 1-2 times a week on average and less than 30 minutes on average.    General Health:  - Sleep: 7-8 hours of sleep on average.  - Hearing: normal hearing bilateral ears.  - Vision: vision problems, most recent eye exam < 1 year ago and wears glasses and contacts. astigmatism both eyes  - Dental: no dental visits for > 1 year and brushes teeth twice daily.    /GYN Health:  - Follows with GYN: yes.   - Last menstrual cycle: 2025.   - History of STDs: no    Advanced Care Planning:  - Has an advanced directive?: no    - Has a durable  "medical POA?: no      Review of Systems   Constitutional:  Positive for fatigue. Negative for chills and fever.   HENT:  Negative for congestion, rhinorrhea and sore throat.    Respiratory:  Positive for chest tightness. Negative for shortness of breath.         For the weekend patient had mild asthma symptoms while pulling weeds   Cardiovascular:  Negative for chest pain.   Gastrointestinal:  Negative for abdominal pain, constipation, diarrhea, nausea and vomiting.   Neurological:  Positive for headaches (mild migraine, resolved after sleep). Negative for dizziness and light-headedness.   Psychiatric/Behavioral:  Negative for sleep disturbance.          Objective   /70 (BP Location: Left arm, Patient Position: Sitting, Cuff Size: Large)   Pulse 74   Temp 97.6 °F (36.4 °C) (Temporal)   Ht 5' 7\" (1.702 m)   Wt 94.2 kg (207 lb 9.6 oz)   LMP 04/26/2025   SpO2 98%   BMI 32.51 kg/m²     Physical Exam  Vitals reviewed.   Constitutional:       General: She is not in acute distress.     Appearance: Normal appearance. She is obese. She is not ill-appearing, toxic-appearing or diaphoretic.     Cardiovascular:      Rate and Rhythm: Normal rate and regular rhythm.      Pulses: Normal pulses.      Heart sounds: Normal heart sounds. No murmur heard.  Pulmonary:      Effort: Pulmonary effort is normal. No respiratory distress.      Breath sounds: Normal breath sounds. No wheezing.   Abdominal:      General: Abdomen is flat.      Palpations: Abdomen is soft.     Musculoskeletal:         General: No swelling or deformity.     Skin:     General: Skin is warm and dry.      Capillary Refill: Capillary refill takes less than 2 seconds.      Coloration: Skin is not jaundiced.     Neurological:      General: No focal deficit present.      Mental Status: She is alert and oriented to person, place, and time.     Psychiatric:         Mood and Affect: Mood normal.         Moose Hutchinson M.D.  Family Medicine    Please excuse any " "\"sound-alike\" errors that may have ocurred during the process of dictation. Parts of this note have been dictated and there may be errors present in the transcription process. Thank you.      "

## 2025-07-28 RX ORDER — SEMAGLUTIDE 2.4 MG/.75ML
2.4 INJECTION, SOLUTION SUBCUTANEOUS WEEKLY
Qty: 3 ML | Refills: 2 | Status: SHIPPED | OUTPATIENT
Start: 2025-07-28

## 2025-08-01 ENCOUNTER — APPOINTMENT (OUTPATIENT)
Dept: LAB | Facility: MEDICAL CENTER | Age: 40
End: 2025-08-01
Attending: STUDENT IN AN ORGANIZED HEALTH CARE EDUCATION/TRAINING PROGRAM
Payer: COMMERCIAL

## 2025-08-01 DIAGNOSIS — R79.89 ELEVATED DHEA: ICD-10-CM

## 2025-08-01 PROCEDURE — 82627 DEHYDROEPIANDROSTERONE: CPT

## 2025-08-01 PROCEDURE — 36415 COLL VENOUS BLD VENIPUNCTURE: CPT

## 2025-08-01 RX ORDER — SEMAGLUTIDE 2.4 MG/.75ML
2.4 INJECTION, SOLUTION SUBCUTANEOUS WEEKLY
Qty: 3 ML | Refills: 2 | OUTPATIENT
Start: 2025-08-01

## 2025-08-02 LAB — DHEA-S SERPL-MCNC: 475 UG/DL (ref 57.3–279.2)

## 2025-08-08 ENCOUNTER — OFFICE VISIT (OUTPATIENT)
Dept: FAMILY MEDICINE CLINIC | Facility: CLINIC | Age: 40
End: 2025-08-08
Payer: COMMERCIAL

## 2025-08-08 VITALS
SYSTOLIC BLOOD PRESSURE: 114 MMHG | WEIGHT: 198.4 LBS | HEART RATE: 95 BPM | DIASTOLIC BLOOD PRESSURE: 82 MMHG | BODY MASS INDEX: 31.14 KG/M2 | HEIGHT: 67 IN | TEMPERATURE: 97.8 F | OXYGEN SATURATION: 99 %

## 2025-08-08 DIAGNOSIS — M43.6 TORTICOLLIS: Primary | ICD-10-CM

## 2025-08-08 PROCEDURE — 99213 OFFICE O/P EST LOW 20 MIN: CPT | Performed by: FAMILY MEDICINE

## 2025-08-08 RX ORDER — CYCLOBENZAPRINE HCL 10 MG
5-10 TABLET ORAL 3 TIMES DAILY PRN
Qty: 30 TABLET | Refills: 0 | Status: SHIPPED | OUTPATIENT
Start: 2025-08-08